# Patient Record
Sex: FEMALE | Race: WHITE | Employment: OTHER | URBAN - METROPOLITAN AREA
[De-identification: names, ages, dates, MRNs, and addresses within clinical notes are randomized per-mention and may not be internally consistent; named-entity substitution may affect disease eponyms.]

---

## 2017-01-11 ENCOUNTER — OP HISTORICAL/CONVERTED ENCOUNTER (OUTPATIENT)
Dept: OTHER | Age: 62
End: 2017-01-11

## 2017-08-11 ENCOUNTER — OP HISTORICAL/CONVERTED ENCOUNTER (OUTPATIENT)
Dept: OTHER | Age: 62
End: 2017-08-11

## 2017-09-07 ENCOUNTER — OP HISTORICAL/CONVERTED ENCOUNTER (OUTPATIENT)
Dept: OTHER | Age: 62
End: 2017-09-07

## 2017-09-20 ENCOUNTER — OP HISTORICAL/CONVERTED ENCOUNTER (OUTPATIENT)
Dept: OTHER | Age: 62
End: 2017-09-20

## 2019-02-15 RX ORDER — DIAZEPAM 2 MG/1
2 TABLET ORAL 2 TIMES DAILY
COMMUNITY
End: 2022-05-02

## 2019-02-15 RX ORDER — CHOLECALCIFEROL (VITAMIN D3) 125 MCG
5000 CAPSULE ORAL
COMMUNITY

## 2019-02-15 RX ORDER — PROPRANOLOL HYDROCHLORIDE 60 MG/1
10 CAPSULE, EXTENDED RELEASE ORAL AS NEEDED
COMMUNITY
End: 2022-05-02

## 2019-02-15 RX ORDER — BUDESONIDE AND FORMOTEROL FUMARATE DIHYDRATE 160; 4.5 UG/1; UG/1
AEROSOL RESPIRATORY (INHALATION) 2 TIMES DAILY
COMMUNITY
End: 2022-01-12

## 2019-02-15 RX ORDER — HYDROCHLOROTHIAZIDE 12.5 MG/1
12.5 TABLET ORAL DAILY
COMMUNITY
End: 2022-01-03 | Stop reason: SDUPTHER

## 2019-02-15 RX ORDER — CITALOPRAM 40 MG/1
40 TABLET, FILM COATED ORAL DAILY
COMMUNITY

## 2019-02-15 RX ORDER — TRAZODONE HYDROCHLORIDE 100 MG/1
100 TABLET ORAL
COMMUNITY
End: 2022-05-02

## 2019-02-15 RX ORDER — BISMUTH SUBSALICYLATE 262 MG
1 TABLET,CHEWABLE ORAL DAILY
COMMUNITY
End: 2022-01-12

## 2019-02-15 RX ORDER — LEVALBUTEROL INHALATION SOLUTION 1.25 MG/3ML
1.25 SOLUTION RESPIRATORY (INHALATION)
COMMUNITY
End: 2022-05-02

## 2019-02-15 RX ORDER — BUSPIRONE HYDROCHLORIDE 10 MG/1
10 TABLET ORAL DAILY
COMMUNITY

## 2019-02-15 RX ORDER — MONTELUKAST SODIUM 10 MG/1
10 TABLET ORAL
COMMUNITY
End: 2022-01-03 | Stop reason: SDUPTHER

## 2019-02-15 RX ORDER — PHENOL/SODIUM PHENOLATE
40 AEROSOL, SPRAY (ML) MUCOUS MEMBRANE DAILY
COMMUNITY
End: 2022-08-08 | Stop reason: SDUPTHER

## 2019-02-15 RX ORDER — ESTRADIOL 1 MG/1
1 TABLET ORAL DAILY
COMMUNITY
End: 2022-09-09 | Stop reason: SDUPTHER

## 2019-02-20 ENCOUNTER — HOSPITAL ENCOUNTER (OUTPATIENT)
Age: 64
Setting detail: OUTPATIENT SURGERY
Discharge: HOME | End: 2019-02-20
Attending: COLON & RECTAL SURGERY | Admitting: COLON & RECTAL SURGERY
Payer: MEDICAID

## 2019-02-20 ENCOUNTER — ANESTHESIA EVENT (OUTPATIENT)
Dept: ENDOSCOPY | Age: 64
End: 2019-02-20
Payer: MEDICAID

## 2019-02-20 ENCOUNTER — ANESTHESIA (OUTPATIENT)
Dept: ENDOSCOPY | Age: 64
End: 2019-02-20
Payer: MEDICAID

## 2019-02-20 VITALS
DIASTOLIC BLOOD PRESSURE: 68 MMHG | HEIGHT: 64 IN | BODY MASS INDEX: 36.88 KG/M2 | RESPIRATION RATE: 14 BRPM | WEIGHT: 216 LBS | SYSTOLIC BLOOD PRESSURE: 115 MMHG | HEART RATE: 66 BPM | OXYGEN SATURATION: 100 % | TEMPERATURE: 97.8 F

## 2019-02-20 PROCEDURE — 76060000031 HC ANESTHESIA FIRST 0.5 HR: Performed by: COLON & RECTAL SURGERY

## 2019-02-20 PROCEDURE — 76040000019: Performed by: COLON & RECTAL SURGERY

## 2019-02-20 PROCEDURE — 74011250636 HC RX REV CODE- 250/636

## 2019-02-20 RX ORDER — SODIUM CHLORIDE 0.9 % (FLUSH) 0.9 %
5-40 SYRINGE (ML) INJECTION EVERY 8 HOURS
Status: DISCONTINUED | OUTPATIENT
Start: 2019-02-20 | End: 2019-02-20 | Stop reason: HOSPADM

## 2019-02-20 RX ORDER — PROPOFOL 10 MG/ML
INJECTION, EMULSION INTRAVENOUS AS NEEDED
Status: DISCONTINUED | OUTPATIENT
Start: 2019-02-20 | End: 2019-02-20 | Stop reason: HOSPADM

## 2019-02-20 RX ORDER — PROPOFOL 10 MG/ML
INJECTION, EMULSION INTRAVENOUS
Status: DISCONTINUED | OUTPATIENT
Start: 2019-02-20 | End: 2019-02-20 | Stop reason: HOSPADM

## 2019-02-20 RX ORDER — NALOXONE HYDROCHLORIDE 0.4 MG/ML
0.4 INJECTION, SOLUTION INTRAMUSCULAR; INTRAVENOUS; SUBCUTANEOUS
Status: DISCONTINUED | OUTPATIENT
Start: 2019-02-20 | End: 2019-02-20 | Stop reason: HOSPADM

## 2019-02-20 RX ORDER — GLYCOPYRROLATE 0.2 MG/ML
INJECTION INTRAMUSCULAR; INTRAVENOUS AS NEEDED
Status: DISCONTINUED | OUTPATIENT
Start: 2019-02-20 | End: 2019-02-20

## 2019-02-20 RX ORDER — GLYCOPYRROLATE 0.2 MG/ML
INJECTION INTRAMUSCULAR; INTRAVENOUS AS NEEDED
Status: DISCONTINUED | OUTPATIENT
Start: 2019-02-20 | End: 2019-02-20 | Stop reason: HOSPADM

## 2019-02-20 RX ORDER — SODIUM CHLORIDE 9 MG/ML
INJECTION, SOLUTION INTRAVENOUS
Status: DISCONTINUED | OUTPATIENT
Start: 2019-02-20 | End: 2019-02-20 | Stop reason: HOSPADM

## 2019-02-20 RX ORDER — DEXTROMETHORPHAN/PSEUDOEPHED 2.5-7.5/.8
1.2 DROPS ORAL
Status: DISCONTINUED | OUTPATIENT
Start: 2019-02-20 | End: 2019-02-20 | Stop reason: HOSPADM

## 2019-02-20 RX ORDER — FLUMAZENIL 0.1 MG/ML
0.2 INJECTION INTRAVENOUS
Status: DISCONTINUED | OUTPATIENT
Start: 2019-02-20 | End: 2019-02-20 | Stop reason: HOSPADM

## 2019-02-20 RX ORDER — ATROPINE SULFATE 0.1 MG/ML
0.5 INJECTION INTRAVENOUS
Status: DISCONTINUED | OUTPATIENT
Start: 2019-02-20 | End: 2019-02-20 | Stop reason: HOSPADM

## 2019-02-20 RX ORDER — SODIUM CHLORIDE 0.9 % (FLUSH) 0.9 %
5-40 SYRINGE (ML) INJECTION AS NEEDED
Status: DISCONTINUED | OUTPATIENT
Start: 2019-02-20 | End: 2019-02-20 | Stop reason: HOSPADM

## 2019-02-20 RX ORDER — LIDOCAINE HYDROCHLORIDE 20 MG/ML
INJECTION, SOLUTION EPIDURAL; INFILTRATION; INTRACAUDAL; PERINEURAL AS NEEDED
Status: DISCONTINUED | OUTPATIENT
Start: 2019-02-20 | End: 2019-02-20 | Stop reason: HOSPADM

## 2019-02-20 RX ADMIN — PROPOFOL 20 MG: 10 INJECTION, EMULSION INTRAVENOUS at 13:29

## 2019-02-20 RX ADMIN — PROPOFOL 80 MG: 10 INJECTION, EMULSION INTRAVENOUS at 13:18

## 2019-02-20 RX ADMIN — SODIUM CHLORIDE: 9 INJECTION, SOLUTION INTRAVENOUS at 13:09

## 2019-02-20 RX ADMIN — LIDOCAINE HYDROCHLORIDE 40 MG: 20 INJECTION, SOLUTION EPIDURAL; INFILTRATION; INTRACAUDAL; PERINEURAL at 13:18

## 2019-02-20 RX ADMIN — PROPOFOL 120 MCG/KG/MIN: 10 INJECTION, EMULSION INTRAVENOUS at 13:16

## 2019-02-20 NOTE — PERIOP NOTES
Report from Oswald Marcelino, see anesthesia record. ABD remains soft and non-tender post procedure. Pt has no complaints at this time and tolerated the procedure well. Endoscope was pre-cleaned at bedside immediately following procedure by [facility].

## 2019-02-20 NOTE — PROCEDURES
[facility]   Endo Brief Procedure Note    Kimberlee Fears  1955  [id number]    Date of Procedure: 2/20/2019    Preoperative diagnosis: T83.711A Erosion of implanted vaginal mesh to surrounding organ or tissue, initial encounter    Postoperative diagnosis: * No post-op diagnosis entered *    Procedure: Procedure(s):  COLONOSCOPY    :  Dr. Sameer Medina MD    Assistant(s): Endoscopy RN-1: Kristina Caraballo RN  Endoscopy RN-2: Isa Pang RN    EBL:None    Anesthesia/Sedation: Moderate sedation / MAC    Specimens: * No specimens in log *    Findings: diverticulosis    Complications: None    Dr. Sameer Medina MD  2/20/2019  1:35 PM

## 2019-02-20 NOTE — ANESTHESIA POSTPROCEDURE EVALUATION
Procedure(s):  COLONOSCOPY. Anesthesia Post Evaluation      Multimodal analgesia: multimodal analgesia used between 6 hours prior to anesthesia start to PACU discharge  Patient location during evaluation: PACU  Patient participation: complete - patient participated  Level of consciousness: awake and alert  Airway patency: patent  Anesthetic complications: no  Cardiovascular status: acceptable  Respiratory status: acceptable  Hydration status: acceptable        Visit Vitals  BP 96/52   Pulse 70   Temp 36.6 °C (97.8 °F)   Resp 16   Ht 5' 3.75\" (1.619 m)   Wt 98 kg (216 lb)   SpO2 100%   Breastfeeding?  No   BMI 37.37 kg/m²

## 2019-02-20 NOTE — ANESTHESIA PREPROCEDURE EVALUATION
Anesthetic History   No history of anesthetic complications            Review of Systems / Medical History  Patient summary reviewed, nursing notes reviewed and pertinent labs reviewed    Pulmonary        Sleep apnea    Asthma (well controlled, no attack in 8 months)        Neuro/Psych         Psychiatric history     Cardiovascular  Within defined limits                  Comments:  Inderal PRN for anxiety           GI/Hepatic/Renal  Within defined limits   GERD           Endo/Other    Diabetes    Obesity and arthritis     Other Findings   Comments: Erosion of implanted vaginal mesh to surrounding organ or tissue, initial encounter    Chronic vaginal discharge    Patient is a nurse           Physical Exam    Airway  Mallampati: II  TM Distance: < 4 cm  Neck ROM: normal range of motion   Mouth opening: Normal     Cardiovascular  Regular rate and rhythm,  S1 and S2 normal,  no murmur, click, rub, or gallop  Rhythm: regular  Rate: normal         Dental  No notable dental hx       Pulmonary  Breath sounds clear to auscultation               Abdominal  GI exam deferred       Other Findings            Anesthetic Plan    ASA: 3  Anesthesia type: MAC            Anesthetic plan and risks discussed with: Patient

## 2019-02-20 NOTE — ROUTINE PROCESS
Migue Lozano  1955  [id number]    Situation:  Verbal report received from: Pallavi  Procedure: Procedure(s):  COLONOSCOPY    Background:    Preoperative diagnosis: T83.711A Erosion of implanted vaginal mesh to surrounding organ or tissue, initial encounter  Postoperative diagnosis: Diverticulosis    :  Dr. Dill Left  Assistant(s): Endoscopy RN-1: Elyssa Saunders RN  Endoscopy RN-2: Brianna Parrish RN    Specimens: * No specimens in log *  H. Pylori  no    Assessment:  Intra-procedure medications   Anesthesia gave intra-procedure sedation and medications, see anesthesia flow sheet yes    Intravenous fluids: NS@ KVO     Vital signs stable     Abdominal assessment: round and soft     Recommendation:  Discharge patient per MD order.   Return to floor  Family or Friend   Permission to share finding with family or friend yes

## 2019-02-20 NOTE — DISCHARGE INSTRUCTIONS
Meagan Villegas  [id number]  1955    COLON DISCHARGE INSTRUCTIONS  Discomfort:  Redness at IV site- apply warm compress to area; if redness or soreness persist- contact your physician  There may be a slight amount of blood passed from the rectum  Gaseous discomfort- walking, belching will help relieve any discomfort  You may not operate a vehicle for 12 hours  You may not engage in an occupation involving machinery or appliances for rest of today  You may not drink alcoholic beverages for at least 12 hours  Avoid making any critical decisions for at least 24 hour  DIET:   High fiber diet. - however -  remember your colon is empty and a heavy meal will produce gas. Avoid these foods:  vegetables, fried / greasy foods, carbonated drinks for today    MEDICATIONS:           ACTIVITY:  You may resume your normal daily activities it is recommended that you spend the remainder of the day resting -  avoid any strenuous activity. CALL M.D. ANY SIGN OF:   Increasing pain, nausea, vomiting  Abdominal distension (swelling)  New increased bleeding (oral or rectal)  Fever (chills)  Pain in chest area  Bloody discharge from nose or mouth  Shortness of breath     Follow-up Instructions:   Call Michael Juarez MD if any questions or problems. Telephone # [phone]  Should have a repeat colonoscopy in 10 years. COLONOSCOPY FINDINGS:  Your colonoscopy showed: diverticulosis; no erosion of mesh into rectum or colon.

## 2019-02-20 NOTE — H&P
Gastroenterology Outpatient History and Physical    Patient: Pernell Serrano    Physician: Rupinder Bennett MD    Vital Signs: Blood pressure 142/71, pulse 72, temperature 97.9 °F (36.6 °C), resp. rate 13, height 5' 3.75\" (1.619 m), weight 98 kg (216 lb), SpO2 100 %, not currently breastfeeding. Allergies: Allergies   Allergen Reactions    Celebrex [Celecoxib] Contact Dermatitis    Iodinated Contrast- Oral And Iv Dye Shortness of Breath     Short of breath, swelling of mouth, tongue, and throat.  Neosporin [Neomycin-Bacitracnzn-Polymyxnb] Contact Dermatitis       Chief Complaint: change in bowel habits    History of Present Illness: as above    Justification for Procedure: as above    History:  Past Medical History:   Diagnosis Date    Arthritis     osteo    Asthma     Diabetes ([address]     Borderline diabetes    GERD (gastroesophageal reflux disease)     Ill-defined condition     Left ventricular heart hypertension without heart failure    Ill-defined condition     spondylosis of lumbar    Ill-defined condition     abnormality of gait, uses walking cane    Ill-defined condition     insomnia    Ill-defined condition     vitamin D deficiency    Ill-defined condition     hearing loss wears, hearing aids bilat    Psychiatric disorder     anxiety, depression    Sleep apnea     Uses C-Pap machine at night      Past Surgical History:   Procedure Laterality Date    HX GI      last colonoscopy 2015    HX GYN      bladder sling applied and later removed    HX GYN      MIGUEL    NEUROLOGICAL PROCEDURE UNLISTED      removal of pituitary tumor 1982      Social History     Socioeconomic History    Marital status:      Spouse name: Not on file    Number of children: Not on file    Years of education: Not on file    Highest education level: Not on file   Tobacco Use    Smoking status: Never Smoker    Smokeless tobacco: Never Used   Substance and Sexual Activity    Alcohol use:  No Frequency: Never    Drug use: No    History reviewed. No pertinent family history. Medications:   Prior to Admission medications    Medication Sig Start Date End Date Taking? Authorizing Provider   Omeprazole delayed release (PRILOSEC D/R) 20 mg tablet Take 20 mg by mouth daily. Yes Provider, Historical   hydroCHLOROthiazide (HYDRODIURIL) 12.5 mg tablet Take 12.5 mg by mouth daily. Yes Provider, Historical   estradiol (ESTRACE) 1 mg tablet Take 1 mg by mouth daily. Yes Provider, Historical   citalopram (CELEXA) 40 mg tablet Take 40 mg by mouth daily. Yes Provider, Historical   busPIRone (BUSPAR) 10 mg tablet Take 10 mg by mouth three (3) times daily. Yes Provider, Historical   diazePAM (VALIUM) 2 mg tablet Take 2 mg by mouth two (2) times a day. Yes Provider, Historical   montelukast (SINGULAIR) 10 mg tablet Take 10 mg by mouth. Yes Provider, Historical   traZODone (DESYREL) 100 mg tablet Take 100 mg by mouth nightly. Yes Provider, Historical   budesonide-formoterol (SYMBICORT) 160-4.5 mcg/actuation HFAA Take  by inhalation two (2) times a day. Yes Provider, Historical   propranolol LA (INDERAL LA) 60 mg SR capsule Take 10 mg by mouth three (3) times daily as needed. Yes Provider, Historical   cholecalciferol, vitamin D3, (VITAMIN D3) 2,000 unit tab Take  by mouth. Provider, Historical   multivitamin (ONE A DAY) tablet Take 1 Tab by mouth daily. Provider, Historical   levalbuterol (XOPENEX) 1.25 mg/3 mL nebu 1.25 mg by Nebulization route every six (6) hours as needed. Provider, Historical       Physical Exam:   General: alert, no distress   HEENT: Head: Normocephalic, no lesions, without obvious abnormality.    Heart: regular rate and rhythm, S1, S2 normal, no murmur, click, rub or gallop   Lungs: chest clear, no wheezing, rales, normal symmetric air entry   Abdominal: Bowel sounds are normal, liver is not enlarged, spleen is not enlarged   Neurological: Grossly normal   Extremities: extremities normal, atraumatic, no cyanosis or edema     Findings/Diagnosis: as above    Plan of Care/Planned Procedure: colonoscopy    Signed By: Michael Juarez MD     February 20, 2019

## 2019-02-21 NOTE — OP NOTES
Nilson Aguiar [facility] 81  OPERATIVE REPORT    Name:  Hussain Rawls  MR#:  [id number]  :  1955  ACCOUNT #:  [de-identified]  DATE OF SERVICE:  2019    PREOPERATIVE DIAGNOSIS:  Rule out mesh erosion into rectum or colon. POSTOPERATIVE DIAGNOSIS:  Minimal diverticulosis. No erosion of mesh appreciated on  the colonic or rectal side. PROCEDURE PERFORMED:  Colonoscopy. SURGEON:  Rsoa Ramírez MD    ASSISTANT:  Dominga    ANESTHESIA:  MAC    COMPLICATIONS:  None. SPECIMENS REMOVED:  None. IMPLANTS:  None. ESTIMATED BLOOD LOSS:  None. INDICATIONS:  The patient is 66years old. She has had some vaginal discharge and  evidence of erosion of mesh into the vagina. PROCEDURE:  After mechanical bowel prep and after intravenous sedation with 320 mg of  propofol, the Olympus scope was introduced into the rectum and advanced under direct  visualization to the cecum. Ileocecal valve and cecal cap well visualized. Good  bowel prep. Scope was slowly withdrawn. Mucosa inspected. No polyps or neoplasia  were seen. She did have diverticulosis of the sigmoid which is uncomplicated. Transit through the sigmoid was quite easy. Very careful examination of the anus,  rectum, and sigmoid showed no evidence of any mesh erosion here. The mesh was  palpable on the vaginal side with some erosion on the vaginal aspect but no evident  erosion into the bowel at any leve. RECOMMENDATION:  Dr. Elijah Grajeda will proceed with surgery as indicated. Repeat  colonoscopy in 10 years.         MD LEV Mccoy/[email or web address]/  D:  2019 13:52  T:  2019 22:05  JOB #:  [id number]  CC:  MD Oralia Evans MD       [facility]

## 2019-10-06 ENCOUNTER — IP HISTORICAL/CONVERTED ENCOUNTER (OUTPATIENT)
Dept: OTHER | Age: 64
End: 2019-10-06

## 2020-09-12 ENCOUNTER — HOSPITAL ENCOUNTER (OUTPATIENT)
Dept: PREADMISSION TESTING | Age: 65
Discharge: HOME | End: 2020-09-12
Payer: MEDICARE

## 2020-09-12 PROCEDURE — 87635 SARS-COV-2 COVID-19 AMP PRB: CPT

## 2020-09-15 LAB — SARS-COV-2, COV2NT: NOT DETECTED

## 2020-09-16 ENCOUNTER — HOSPITAL ENCOUNTER (OUTPATIENT)
Dept: GENERAL RADIOLOGY | Age: 65
Discharge: HOME | End: 2020-09-16
Payer: MEDICARE

## 2020-09-16 ENCOUNTER — HOSPITAL ENCOUNTER (OUTPATIENT)
Dept: PULMONOLOGY | Age: 65
Discharge: HOME | End: 2020-09-16
Payer: MEDICARE

## 2020-09-16 DIAGNOSIS — J45.909 ASTHMA: ICD-10-CM

## 2020-09-16 PROCEDURE — 94726 PLETHYSMOGRAPHY LUNG VOLUMES: CPT

## 2020-09-16 PROCEDURE — 94375 RESPIRATORY FLOW VOLUME LOOP: CPT

## 2020-09-16 PROCEDURE — 71046 X-RAY EXAM CHEST 2 VIEWS: CPT

## 2020-09-16 PROCEDURE — 94729 DIFFUSING CAPACITY: CPT

## 2021-02-11 PROBLEM — F41.9 ANXIETY: Status: ACTIVE | Noted: 2021-02-11

## 2021-02-11 PROBLEM — K21.9 GASTROESOPHAGEAL REFLUX: Status: ACTIVE | Noted: 2021-02-11

## 2021-02-11 PROBLEM — N76.5 VAGINAL ULCER: Status: ACTIVE | Noted: 2021-02-11

## 2021-02-11 PROBLEM — Z78.0 MENOPAUSE: Status: ACTIVE | Noted: 2021-02-11

## 2021-02-11 PROBLEM — J45.909 ASTHMA: Status: ACTIVE | Noted: 2021-02-11

## 2021-02-11 PROBLEM — B00.9 HERPES SIMPLEX TYPE 1 ANTIBODY POSITIVE: Status: ACTIVE | Noted: 2021-02-11

## 2021-02-11 PROBLEM — I10 HYPERTENSIVE DISORDER: Status: ACTIVE | Noted: 2021-02-11

## 2021-02-11 RX ORDER — TRIAMCINOLONE ACETONIDE 1 MG/G
OINTMENT TOPICAL 2 TIMES DAILY
COMMUNITY

## 2021-02-11 RX ORDER — INDOMETHACIN 50 MG/1
CAPSULE ORAL 3 TIMES DAILY
COMMUNITY
End: 2022-01-03

## 2021-02-11 RX ORDER — ONDANSETRON 4 MG/1
4 TABLET, ORALLY DISINTEGRATING ORAL
COMMUNITY
End: 2022-01-03

## 2021-09-01 ENCOUNTER — HOSPITAL ENCOUNTER (OUTPATIENT)
Dept: GENERAL RADIOLOGY | Age: 66
Discharge: HOME | End: 2021-09-01
Payer: MEDICARE

## 2021-09-01 ENCOUNTER — TRANSCRIBE ORDER (OUTPATIENT)
Dept: GENERAL RADIOLOGY | Age: 66
End: 2021-09-01

## 2021-09-01 DIAGNOSIS — J45.909 ASTHMA: Primary | ICD-10-CM

## 2021-09-01 DIAGNOSIS — J45.909 ASTHMA: ICD-10-CM

## 2021-09-01 PROCEDURE — 71046 X-RAY EXAM CHEST 2 VIEWS: CPT

## 2022-01-03 ENCOUNTER — OFFICE VISIT (OUTPATIENT)
Dept: FAMILY MEDICINE CLINIC | Age: 67
End: 2022-01-03
Payer: MEDICARE

## 2022-01-03 VITALS
BODY MASS INDEX: 38.29 KG/M2 | WEIGHT: 224.3 LBS | HEIGHT: 64 IN | TEMPERATURE: 98.3 F | HEART RATE: 80 BPM | OXYGEN SATURATION: 97 % | DIASTOLIC BLOOD PRESSURE: 81 MMHG | SYSTOLIC BLOOD PRESSURE: 121 MMHG

## 2022-01-03 DIAGNOSIS — Z79.890 HORMONE REPLACEMENT THERAPY (POSTMENOPAUSAL): ICD-10-CM

## 2022-01-03 DIAGNOSIS — J30.81 ALLERGIC RHINITIS DUE TO ANIMAL HAIR AND DANDER: ICD-10-CM

## 2022-01-03 DIAGNOSIS — E78.49 OTHER HYPERLIPIDEMIA: ICD-10-CM

## 2022-01-03 DIAGNOSIS — Z13.820 ENCOUNTER FOR SCREENING FOR OSTEOPOROSIS: ICD-10-CM

## 2022-01-03 DIAGNOSIS — R06.2 WHEEZING: ICD-10-CM

## 2022-01-03 DIAGNOSIS — K21.9 GASTROESOPHAGEAL REFLUX DISEASE, UNSPECIFIED WHETHER ESOPHAGITIS PRESENT: ICD-10-CM

## 2022-01-03 DIAGNOSIS — H91.13 PRESBYCUSIS OF BOTH EARS: ICD-10-CM

## 2022-01-03 DIAGNOSIS — E11.9 TYPE 2 DIABETES MELLITUS WITHOUT COMPLICATION, WITHOUT LONG-TERM CURRENT USE OF INSULIN (HCC): ICD-10-CM

## 2022-01-03 DIAGNOSIS — J45.40 MODERATE PERSISTENT ASTHMA, UNSPECIFIED WHETHER COMPLICATED: Primary | ICD-10-CM

## 2022-01-03 DIAGNOSIS — G47.33 OBSTRUCTIVE SLEEP APNEA SYNDROME: ICD-10-CM

## 2022-01-03 DIAGNOSIS — F41.9 ANXIETY: ICD-10-CM

## 2022-01-03 DIAGNOSIS — M15.9 OSTEOARTHRITIS OF MULTIPLE JOINTS, UNSPECIFIED OSTEOARTHRITIS TYPE: ICD-10-CM

## 2022-01-03 DIAGNOSIS — G47.09 OTHER INSOMNIA: ICD-10-CM

## 2022-01-03 DIAGNOSIS — I10 PRIMARY HYPERTENSION: ICD-10-CM

## 2022-01-03 PROBLEM — E55.9 VITAMIN D DEFICIENCY: Status: ACTIVE | Noted: 2022-01-03

## 2022-01-03 LAB
ALBUMIN SERPL-MCNC: 3.5 G/DL (ref 3.5–5)
ALBUMIN/GLOB SERPL: 1 {RATIO} (ref 1.1–2.2)
ALP SERPL-CCNC: 104 U/L (ref 45–117)
ALT SERPL-CCNC: 21 U/L (ref 12–78)
ANION GAP SERPL CALC-SCNC: 5 MMOL/L (ref 5–15)
AST SERPL-CCNC: 13 U/L (ref 15–37)
BASOPHILS # BLD: 0.1 K/UL (ref 0–0.1)
BASOPHILS NFR BLD: 1 % (ref 0–1)
BILIRUB SERPL-MCNC: 0.2 MG/DL (ref 0.2–1)
BUN SERPL-MCNC: 11 MG/DL (ref 6–20)
BUN/CREAT SERPL: 15 (ref 12–20)
CALCIUM SERPL-MCNC: 9.2 MG/DL (ref 8.5–10.1)
CHLORIDE SERPL-SCNC: 103 MMOL/L (ref 97–108)
CO2 SERPL-SCNC: 29 MMOL/L (ref 21–32)
CREAT SERPL-MCNC: 0.74 MG/DL (ref 0.55–1.02)
DIFFERENTIAL METHOD BLD: ABNORMAL
EOSINOPHIL # BLD: 0.4 K/UL (ref 0–0.4)
EOSINOPHIL NFR BLD: 4 % (ref 0–7)
ERYTHROCYTE [DISTWIDTH] IN BLOOD BY AUTOMATED COUNT: 13.1 % (ref 11.5–14.5)
GLOBULIN SER CALC-MCNC: 3.5 G/DL (ref 2–4)
GLUCOSE SERPL-MCNC: 119 MG/DL (ref 65–100)
HCT VFR BLD AUTO: 38.4 % (ref 35–47)
HGB BLD-MCNC: 12.5 G/DL (ref 11.5–16)
IMM GRANULOCYTES # BLD AUTO: 0 K/UL (ref 0–0.04)
IMM GRANULOCYTES NFR BLD AUTO: 1 % (ref 0–0.5)
LYMPHOCYTES # BLD: 1.9 K/UL (ref 0.8–3.5)
LYMPHOCYTES NFR BLD: 21 % (ref 12–49)
MCH RBC QN AUTO: 32 PG (ref 26–34)
MCHC RBC AUTO-ENTMCNC: 32.6 G/DL (ref 30–36.5)
MCV RBC AUTO: 98.2 FL (ref 80–99)
MONOCYTES # BLD: 0.7 K/UL (ref 0–1)
MONOCYTES NFR BLD: 7 % (ref 5–13)
NEUTS SEG # BLD: 5.8 K/UL (ref 1.8–8)
NEUTS SEG NFR BLD: 66 % (ref 32–75)
NRBC # BLD: 0 K/UL (ref 0–0.01)
NRBC BLD-RTO: 0 PER 100 WBC
PLATELET # BLD AUTO: 367 K/UL (ref 150–400)
PMV BLD AUTO: 11.2 FL (ref 8.9–12.9)
POTASSIUM SERPL-SCNC: 4 MMOL/L (ref 3.5–5.1)
PROT SERPL-MCNC: 7 G/DL (ref 6.4–8.2)
RBC # BLD AUTO: 3.91 M/UL (ref 3.8–5.2)
SODIUM SERPL-SCNC: 137 MMOL/L (ref 136–145)
WBC # BLD AUTO: 8.8 K/UL (ref 3.6–11)

## 2022-01-03 PROCEDURE — 99204 OFFICE O/P NEW MOD 45 MIN: CPT | Performed by: FAMILY MEDICINE

## 2022-01-03 PROCEDURE — 3017F COLORECTAL CA SCREEN DOC REV: CPT | Performed by: FAMILY MEDICINE

## 2022-01-03 PROCEDURE — G8417 CALC BMI ABV UP PARAM F/U: HCPCS | Performed by: FAMILY MEDICINE

## 2022-01-03 PROCEDURE — G8754 DIAS BP LESS 90: HCPCS | Performed by: FAMILY MEDICINE

## 2022-01-03 PROCEDURE — G8427 DOCREV CUR MEDS BY ELIG CLIN: HCPCS | Performed by: FAMILY MEDICINE

## 2022-01-03 PROCEDURE — 3046F HEMOGLOBIN A1C LEVEL >9.0%: CPT | Performed by: FAMILY MEDICINE

## 2022-01-03 PROCEDURE — 2022F DILAT RTA XM EVC RTNOPTHY: CPT | Performed by: FAMILY MEDICINE

## 2022-01-03 PROCEDURE — G8432 DEP SCR NOT DOC, RNG: HCPCS | Performed by: FAMILY MEDICINE

## 2022-01-03 PROCEDURE — G0463 HOSPITAL OUTPT CLINIC VISIT: HCPCS | Performed by: FAMILY MEDICINE

## 2022-01-03 PROCEDURE — G8752 SYS BP LESS 140: HCPCS | Performed by: FAMILY MEDICINE

## 2022-01-03 RX ORDER — HYDROCHLOROTHIAZIDE 12.5 MG/1
12.5 TABLET ORAL DAILY
Qty: 90 TABLET | Refills: 0 | Status: SHIPPED | OUTPATIENT
Start: 2022-01-03 | End: 2022-08-08 | Stop reason: SDUPTHER

## 2022-01-03 RX ORDER — UREA 10 %
100 LOTION (ML) TOPICAL DAILY
COMMUNITY

## 2022-01-03 RX ORDER — CYCLOBENZAPRINE HCL 5 MG
5 TABLET ORAL
COMMUNITY

## 2022-01-03 RX ORDER — METHYLPREDNISOLONE 4 MG/1
TABLET ORAL
Qty: 1 DOSE PACK | Refills: 0 | Status: SHIPPED | OUTPATIENT
Start: 2022-01-03 | End: 2022-01-12

## 2022-01-03 RX ORDER — MONTELUKAST SODIUM 10 MG/1
10 TABLET ORAL DAILY
Qty: 90 TABLET | Refills: 0 | Status: SHIPPED | OUTPATIENT
Start: 2022-01-03 | End: 2022-05-02

## 2022-01-03 RX ORDER — ASCORBIC ACID 250 MG
500 TABLET ORAL
COMMUNITY

## 2022-01-03 NOTE — PROGRESS NOTES
Opioid Risk Tool This tool should be administered to patients upon an initial visit prior to beginning opioid therapy for pain management. If female If male Score selected   1. Family History of Substance Abuse     Alcohol    Illegal Drugs    Prescription Drugs 1  2  4 3  3  4 3          2.  Personal History of Substance Abuse   Alcohol    Illegal Drugs    Prescription Drug 3  4  5 3  4  5 3       3.  Age (1 Point if 16-45 years)    1   1   0   4.  History of Preadolescence Sexual Abuse    3   0   0   5.  Psychological Disease   Attention-Deficit/Hyperactivity Disorder; Obsessive Compulsive Disorder; Bipolar Disorder; Schizophrenia      Depression   2      1   2      1 0        0    Total Score   6    Low Risk 0 - 3   Moderate Risk 4 - 7    High Risk > 8  Total Score Risk Category   6  Reference: Dale HUNT. Predicting aberrant behaviors in opioid-treated patients: Preliminary validation of the opioid risk tool.  Pain Medicine. 2005;6(6):432-442. Used with permission.    D.I.R.E. Score: Patient Selection for Chronic Opioid Analgesia   For each factor, rate the patient s score from 1-3 based on the explanations in the right hand column.   Score Factor Explanation Diagnosis        3  1 = Benign chronic condition with minimal objective findings or no definite medical diagnosis. Examples: fibromyalgia, migraine headaches, nonspecific back pain.   2 = Slowly progressive condition concordant with moderate pain, or fixed condition with moderate objective findings. Examples: failed back surgery syndrome, back pain with moderate degenerative changes, neuropathic pain.   3 = Advanced condition concordant with severe pain with objective findings. Examples: severe ischemic vascular disease, advanced neuropathy, severe spinal stenosis.    Intractability       3  1 = Few therapies have been tried and the patient takes a passive role in his/her pain management process.   2 = Most customary treatments have been tried but the  Ivis Mishra (: 1955) is a 61 y.o. female, established patient, here for evaluation of the following chief complaint(s):  Establish Care (pt is changing PCP's ) and Asthma (pt is wheezing, believes it is d/t her allergies. )         ASSESSMENT/PLAN:  Below is the assessment and plan developed based on review of pertinent history, physical exam, labs, studies, and medications. 1. Moderate persistent asthma, unspecified whether complicated  O2 saturation 97% at rest and 92% when walking in office with no signs of respiratory distress. Sx unchanged x 3 weeks. No indications for antibiotics at this time. Consider asthma exacerbation. Consider bronchoconstriction or laryngeal stricture as cause of expiratory wheeze when talking.   -Rx 10 day medrol dose pack. Choose longer steroid taper due to patient having improvement but not resolution in sx with 6 day taper 1 month ago. -Advised patient to use Xopenex q 6 hours for next 3 days on schedule rather than PRN. -CXR ordered and patient instructed to walk in hospital or [facility] to have this done this afternoon to evaluate for lung pathology as cause for persistent wheeze.   -Patient to call pulmonologist this afternoon for sooner appointment for further evaluation and treatment, recommend she see pulmonologist to discuss laryngoscopy    -Written Rx provided for patient to obtain home pulse ox. Monitor O2, go to ED for O2 <90%, worsening SOB, difficulty breathing, chest pain and other severe sx. Patient voices understanding.     -     methylPREDNISolone (MEDROL DOSEPACK) 4 mg tablet; 10 day dose pack, Normal, Disp-1 Dose Pack, R-0  -     XR CHEST PA LAT; Future  -     montelukast (Singulair) 10 mg tablet; Take 1 Tablet by mouth daily. , Normal, Disp-90 Tablet, R-0  -     METABOLIC PANEL, COMPREHENSIVE; Future  -     CBC WITH AUTOMATED DIFF; Future  -     OTHER; Pulse Ox.  Use to monitor oxygen saturation daily and when shortness of breath occurs. Dx code: W11.249, Print, Disp-1 Units, R-0    2. Wheezing  See above  -     methylPREDNISolone (MEDROL DOSEPACK) 4 mg tablet; 10 day dose pack, Normal, Disp-1 Dose Pack, R-0  -     XR CHEST PA LAT; Future    3. Allergic rhinitis due to animal hair and dander  Continue singulair. Discussed allergen avoidance, patient states she has a dog and she cannot get rid of dog even though dog hair worsens asthma sx.   -     montelukast (Singulair) 10 mg tablet; Take 1 Tablet by mouth daily. , Normal, Disp-90 Tablet, R-0    4. Obstructive sleep apnea syndrome  Full face cpap, managed by Dr. Ebony Schwartz pulmonologist    5. Primary hypertension  At goal in office today. Continue HCTZ  -     hydroCHLOROthiazide (HYDRODIURIL) 12.5 mg tablet; Take 1 Tablet by mouth daily. , Normal, Disp-90 Tablet, R-0  -     METABOLIC PANEL, COMPREHENSIVE; Future    6. Gastroesophageal reflux disease, unspecified whether esophagitis present  Well controlled on omeprazole. Patient takes intermittently    7. Osteoarthritis of multiple joints, unspecified osteoarthritis type  L total knee replacement 2019   Wears braces for thumbs   Follows with Ortho   Uses layton for ambulation PRN     8. Type 2 diabetes mellitus without complication, without long-term current use of insulin ([address]  Per review of previous records, A1C 6.6 1/2021. Plan to address with patient at follow up appointment in 1 week. 9. Other hyperlipidemia  Per patient last fasting lipids borderline elevated 9/2021. Plan to recheck in around 3/2021. 10. Anxiety  Taking valium 2mg BID, celexa 40mg daily, buspar 10mg TID. Takes propranolol PRN  Manged by Psych Dr. Marsha Torres counselor Chelly Barrow     11. Other insomnia  Managed by psych. Trazadone PRN. 12. Presbycusis of both ears  Lost hearing aids. Seeing [facility] for new hearing aids this year     13. Encounter for screening for osteoporosis  Reports DEXA per OBGYN 2017 was normal. Next dye 2/16/2022. patient is not fully engaged in the pain management process, or barriers prevent (insurance, transportation, medical illness).   3 = Patient fully engaged in a spectrum of appropriate treatments but with inadequate response.    Risk  (R = Total of P + S + R + C below)    Psychological:       3  1 = Serious personality dysfunction or mental illness interfering with care.   Example: personality disorder, severe affective disorder, significant personality issues.   2 = Personality or mental health interferes moderately. Example: depression or anxiety disorder.   3 = Good communication with clinic. No significant personality dysfunction or mental illness.    Social Support:     2  1 = Life in chaos. Little family support and few close relationships. Loss of most normal life roles.   2 = Reduction in some relationships and life roles.   3 = Supportive family/close relationships. Involved in work or school and no social isolation.    Reliability:    3   1 = History of numerous problems: medication misuse, missed appointments, rarely follows through.   2 = Occasional difficulties with compliance, but generally reliable.   3 = Highly reliable patient with meds, appointments & treatment.    Chemical Health:  2  1 = Active or very recent use of illicit drugs, excessive alcohol, or prescription drug abuse.   2 = Chemical coper (uses medications to cope with stress) or history of CD in remission.   3 = No CD history. Not drug-focused or chemically reliant.   Efficacy score     2  1 = Poor function or minimal pain relief despite moderate to high doses.   2 = Moderate benefit with function improved in a number of ways (or insufficient info - hasn t tried opioid yet or very low doses or too short of a trial).   3 = Good improvement in pain and function and quality of life with stable doses over time.        16   Total score = D + I + R + E   Score 7-13: Not a suitable candidate for long-term opioid analgesia   Score 14-21: May be  14. Hormone replacement therapy post menopausal   Taking estradiol 1.5mg daily. Has seen Dr. Danna Ragland for years. Plans to switch to [address] [place] [address]. Patient's plan of care discussed with and approved of by Dr. Chiquis Manriquez. Return for 1 week for wheezing. SUBJECTIVE/OBJECTIVE:  Chief Complaint   Patient presents with   [facility] Establish Care     pt is changing PCP's     Asthma     pt is wheezing, believes it is d/t her allergies. Patient  is a 62 yo female presenting to office to establish care. She had previously been seen by Dr. Sukumar Doll for last 25 years but would like PCP closer to home. Prefers to be called [facility]. Retired RN. For HTN, patient takes hctz 12.5mg. She was started on this by Dr. Aquilino Cortez cardiologist after workup for chest pain 10 years ago. During echo discovered she has LVH. She follows with Dr. Aquilino Cortez yearly. History of hyperlipidemia, last labs borderline elevated per patient 3 months ago. Not taking a statin, control with diet. Denies history of CAD, MI or stroke. GERD controlled with Omeprazole which patient takes intermittently. Last colonoscopy with Dr. Cornelia Ruiz 2/19/2020 with rec in 10 years. Anxiety and depression: Follows with Dr. Shashi Slater and Joseph Gonzáles as counselor. Uses propranolol PRN for anxiety from previous PCP. Hearing loss: Sees Audiologist at [facility]. 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 Plans to get new hearing aids this year. Asthma: follows with Dr. Mallory Avalos. Symbicort BID, zopinex PRN as she cannot tolerate albuterol due to heart racing. Cannot tolerate large doses of prednisone due to worsening anxiety. Keeps medrol dose pack at home for asthma exacerbations. Has appointment with Pulmonologist in 1 month. For acute asthma, patient notes [facility] for acute asthma exacerbation and URI.  Negative COVID test at this time, rx given for augmentin and 6 day medrol a candidate for long-term opioid analgesia   Source: [facility]   2005.    DIRE Total Score(s)  No flowsheet data found.    Patient Active Problem List    Diagnosis Date Noted     Alcohol abuse, in remission 02/15/2013     Priority: Medium     Dry since 2008       Allergic rhinitis 02/15/2013     Priority: Medium     Problem list name updated by automated process. 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  Chief Complaint   Patient presents with     Refill Request     refill meds     Blood pressure 135/78, pulse 90, temperature 97.8  F (36.6  C), temperature source Oral, resp. rate 16, weight 86.7 kg (191 lb 3.2 oz), SpO2 94 %.  SUBJECTIVE:  Mikhail Bernard is here primarily for followup of left hip pain.  He was diagnosed with advanced OA/osteonecrosis of the left femoral head last time.  He has had six months of pain.  Tramadol has helped his sleep.  He is used to tramadol because he has already tried NSAIDs and acetaminophen without much success and sleep is a major problem for him.  With the tramadol he is able to sleep quite wel.  During the day, it causes a little bit of dizziness and he sees something in his peripheral vision at times, but overall these symptoms are manageable.  He tries to get by with taking Aleve, two tablets in the morning and two tablets at noon, but occasionally he would need a tramadol during the day when the pain is severe.  He would like to be able to get up and walk a little bit as well.  It is worth it to get in to  dose pack. Patient reports sx included wheezing, \"difficulty taking deep breath. \" Reports sx returned as soon as she stopped steroid  -For last 3 weeks, patient notes wheezing sound when breathing but feels like coming from her throat/upper anterior chest. Sx have not worsened or improved in last 3 weeks. Reports chronic daily cough, occasional clear sputum. Reports aunt had throat cancer. Denies chest tightness, chest pain, nasal congestion, fever, chills, post nasal drip, headache, n/v, changes in taste or smell. Has been using zopenex 2 times a day for last several days, improves sx for short period of time but does not resolve wheezing. Also on singulair for allergies. Dog in home who she is allergic to, asthma sx worse when at home. Improve when outside. Osteoarthritis: total L knee replacement on 5/1/2019. Osteoarthritis in L ankle and L thumb. Has braces for thumbs, has cane for walking when needed. Vit D deficiency: on 2000IU daily. On HRT, sees Dr. Radha Franco at [facility]. Planning to switch to [address] [place] [address]. Review of Systems   Constitutional: Positive for fatigue. Negative for fever and unexpected weight change. HENT: Positive for hearing loss. Negative for congestion, ear discharge, ear pain, postnasal drip, rhinorrhea, sinus pressure, sinus pain, sore throat, trouble swallowing and voice change. Eyes: Negative for pain, itching and visual disturbance. Respiratory: Positive for cough and wheezing. Negative for apnea, choking, chest tightness, shortness of breath and stridor. Cardiovascular: Negative for chest pain, palpitations and leg swelling. Gastrointestinal: Negative for abdominal distention and abdominal pain. Musculoskeletal: Positive for arthralgias. Skin: Negative for rash. Neurological: Negative for syncope and speech difficulty. Psychiatric/Behavioral: The patient is nervous/anxious.         Current Outpatient Medications on File Prior to see an orthopedist.  He has a cane.  I reviewed his ORT and Dire as listed above.    For his COPD, he also needs refills of his prednisone.     He does have frequent gastritis symptoms and he takes a lot of Tums to help manage this.     He also needs a refill of Tums.  He doesn't describe any hematemesis or melena.      OBJECTIVE:   GENERAL:  Patient is alert, pleasant, and in no acute distress.  His eye contact is normal.  He doesn't appear markedly depressed.  He denies that when I ask him about it.  Exam is otherwise not done.     I reviewed his x-ray report that shows advanced arthritis of the left hip.      ASSESSMENT/PLAN:   1.  Hip pain, chronic.  This is probably due to osteonecrosis.  His risk factor for that is chronic steroid use.  He has steroid-dependent COPD.  He also has a component of gastritis/reflux that is likely aggravated by NSAIDs.     His DIRE would suggest thathe is a good candidate for long-term opiates.  He has had a nice response already.  His ORT would suggest that he is at moderate risk for aberrant behavior.     My plan today is to refill his tramadol.  He will take one tablet at night and a half-tablet p.r.n during the day; a quantity of 60 was given.  I reviewed the prescription database before prescribing and the only prescription for controlled substances that he has is the one that I gave him last time.     I put a referral into Orthopedics for a consult regarding getting a total hip arthroplasty.  We'll continue the Aleve during the day.  I encouraged him to get up and walk some, as this is good for his overall mental and pulmonary health.  I'm going to add omeprazole 20 mg daily for GI prophylaxis due to the fact that he is on steroids and NSAIDs and his risk of a gastric ulcer is high.  We'll reevaluate the effectiveness of this therapy the next time he returns.  We also had a discussion about cancer screening.  He prefers a FIT test, but he wants to get through the hip issues  Visit   Medication Sig Dispense Refill    cyanocobalamin (VITAMIN B12) 100 mcg tablet Take 100 mcg by mouth daily.  ascorbic acid, vitamin C, (Vitamin C) 250 mg tablet Take  by mouth.  zinc sulfate (ZINC-15 PO) Take  by mouth.  cyclobenzaprine (FLEXERIL) 5 mg tablet Take 5 mg by mouth three (3) times daily as needed for Muscle Spasm(s).  triamcinolone acetonide (KENALOG) 0.1 % ointment Apply  to affected area two (2) times a day. use thin layer      Omeprazole delayed release (PRILOSEC D/R) 20 mg tablet Take 20 mg by mouth daily.  estradiol (ESTRACE) 1 mg tablet Take 1.5 mg by mouth daily.  citalopram (CELEXA) 40 mg tablet Take 40 mg by mouth daily.  busPIRone (BUSPAR) 10 mg tablet Take 10 mg by mouth three (3) times daily.  cholecalciferol, vitamin D3, (VITAMIN D3) 2,000 unit tab Take  by mouth.  diazePAM (VALIUM) 2 mg tablet Take 2 mg by mouth two (2) times a day.  traZODone (DESYREL) 100 mg tablet Take 100 mg by mouth nightly.  budesonide-formoterol (SYMBICORT) 160-4.5 mcg/actuation HFAA Take  by inhalation two (2) times a day.  propranolol LA (INDERAL LA) 60 mg SR capsule Take 10 mg by mouth three (3) times daily as needed.  levalbuterol (XOPENEX) 1.25 mg/3 mL nebu 1.25 mg by Nebulization route every six (6) hours as needed.  [DISCONTINUED] indomethacin (INDOCIN) 50 mg capsule Take  by mouth three (3) times daily. (Patient not taking: Reported on 1/3/2022)      [DISCONTINUED] ondansetron (ZOFRAN ODT) 4 mg disintegrating tablet Take 4 mg by mouth every eight (8) hours as needed for Nausea or Vomiting. (Patient not taking: Reported on 1/3/2022)      multivitamin (ONE A DAY) tablet Take 1 Tab by mouth daily.  [DISCONTINUED] hydroCHLOROthiazide (HYDRODIURIL) 12.5 mg tablet Take 12.5 mg by mouth daily.  [DISCONTINUED] montelukast (SINGULAIR) 10 mg tablet Take 10 mg by mouth.        No current facility-administered medications on file first.          prior to visit. Vitals:    01/03/22 0918   BP: 121/81   Pulse: 80   Temp: 98.3 °F (36.8 °C)   SpO2: 97%   Weight: 224 lb 4.8 oz (101.7 kg)   Height: 5' 3.75\" (1.619 m)   PainSc:   0 - No pain        Physical Exam  Constitutional:       Appearance: Normal appearance. HENT:      Head: Normocephalic and atraumatic. Right Ear: Tympanic membrane, ear canal and external ear normal.      Left Ear: Tympanic membrane, ear canal and external ear normal.      Nose: Nose normal. No congestion or rhinorrhea. Mouth/Throat:      Mouth: Mucous membranes are moist.      Pharynx: Oropharynx is clear. No oropharyngeal exudate or posterior oropharyngeal erythema. Eyes:      General:         Right eye: No discharge. Left eye: No discharge. Extraocular Movements: Extraocular movements intact. Conjunctiva/sclera: Conjunctivae normal.      Pupils: Pupils are equal, round, and reactive to light. Cardiovascular:      Rate and Rhythm: Normal rate and regular rhythm. Pulses: Normal pulses. Heart sounds: Normal heart sounds. Pulmonary:      Effort: Pulmonary effort is normal. No tachypnea, bradypnea, accessory muscle usage, respiratory distress or retractions. Breath sounds: No decreased air movement. Examination of the right-upper field reveals wheezing. Examination of the left-upper field reveals wheezing. Wheezing (expiratory wheeze auscultated only when patient coughs) present. No decreased breath sounds. Comments: Soft expiratory wheeze audible while patient is speaking. Chest:      Chest wall: No tenderness. Abdominal:      General: Abdomen is flat. Bowel sounds are normal.      Palpations: Abdomen is soft. Neurological:      Mental Status: She is alert. Psychiatric:         Mood and Affect: Mood normal.         Behavior: Behavior normal.             An electronic signature was used to authenticate this note.   -- Alberto Muñoz PA-C

## 2022-01-03 NOTE — PROGRESS NOTES
Ynes Gold is a 61 y.o. female , id x 2(name and ). Reviewed record, history, and  medications. Chief Complaint   Patient presents with   [facility]     pt is changing PCP's     Asthma     pt is wheezing, believes it is d/t her allergies. Vitals:    22 0918   BP: 121/81   Pulse: 80   Temp: 98.3 °F (36.8 °C)   SpO2: 97%   Weight: 224 lb 4.8 oz (101.7 kg)   Height: 5' 3.75\" (1.619 m)       Coordination of Care Questionnaire:   1) Have you been to an emergency room, urgent care, or hospitalized since your last visit?   no       2. Have seen or consulted any other health care provider since your last visit? NO      3 most recent PHQ Screens 1/3/2022   PHQ Not Done (No Data)   Little interest or pleasure in doing things Several days   Feeling down, depressed, irritable, or hopeless Several days   Total Score PHQ 2 2       Patient is accompanied by self I have received verbal consent from Ynes Gold to discuss any/all medical information while they are present in the room.

## 2022-01-03 NOTE — PATIENT INSTRUCTIONS
Medrol dose pack for 10 days called in to [facility]  Use Xopenex every 6 hours for next 3 to 4 days  Chest x ray-- walk in to hospital ([facility]) or [address] [place] [address]     Call pulmonologist for sooner appointment     Go to ED if having worsening shortness of breath, chest pain, confusion or other severe symptoms      Shortness of Breath: Care Instructions  Your Care Instructions     Shortness of breath has many causes. Sometimes conditions such as anxiety can lead to shortness of breath. Some people get mild shortness of breath when they exercise. Trouble breathing also can be a symptom of a serious problem, such as asthma, lung disease, emphysema, heart problems, and pneumonia. If your shortness of breath continues, you may need tests and treatment. Watch for any changes in your breathing and other symptoms. Follow-up care is a key part of your treatment and safety. Be sure to make and go to all appointments, and call your doctor if you are having problems. It's also a good idea to know your test results and keep a list of the medicines you take. How can you care for yourself at home? · Do not smoke or allow others to smoke around you. If you need help quitting, talk to your doctor about stop-smoking programs and medicines. These can increase your chances of quitting for good. · Get plenty of rest and sleep. · Take your medicines exactly as prescribed. Call your doctor if you think you are having a problem with your medicine. · Find healthy ways to deal with stress. ? Exercise daily. ? Get plenty of sleep. ? Eat regularly and well. When should you call for help? Call 911 anytime you think you may need emergency care. For example, call if:    · You have severe shortness of breath.     · You have symptoms of a heart attack. These may include:  ? Chest pain or pressure, or a strange feeling in the chest.  ? Sweating. ? Shortness of breath.   ? Nausea or vomiting. ? Pain, pressure, or a strange feeling in the back, neck, jaw, or upper belly or in one or both shoulders or arms. ? Lightheadedness or sudden weakness. ? A fast or irregular heartbeat. After you call 911, the  may tell you to chew 1 adult-strength or 2 to 4 low-dose aspirin. Wait for an ambulance. Do not try to drive yourself. Call your doctor now or seek immediate medical care if:    · Your shortness of breath gets worse or you start to wheeze. Wheezing is a high-pitched sound when you breathe.     · You wake up at night out of breath or have to prop your head up on several pillows to breathe.     · You are short of breath after only light activity or while at rest.   Watch closely for changes in your health, and be sure to contact your doctor if:    · You do not get better over the next 1 to 2 days. Where can you learn more? Go to http://www.gray.com/  Enter S780 in the search box to learn more about \"Shortness of Breath: Care Instructions. \"  Current as of: July 6, 2021               Content Version: 13.0  © 2122-5600 [employer]. Care instructions adapted under license by [employer] (which disclaims liability or warranty for this information). If you have questions about a medical condition or this instruction, always ask your healthcare professional. [place] [address] any warranty or liability for your use of this information.

## 2022-01-04 ENCOUNTER — HOSPITAL ENCOUNTER (OUTPATIENT)
Dept: GENERAL RADIOLOGY | Age: 67
Discharge: HOME | End: 2022-01-04
Payer: MEDICARE

## 2022-01-04 PROCEDURE — 71046 X-RAY EXAM CHEST 2 VIEWS: CPT

## 2022-01-05 NOTE — PROGRESS NOTES
Please call patient and advise Normal WBC count, normal kidneys, liver, electrolytes. Continue with plan discussed in office and follow up with pulmonology.

## 2022-01-05 NOTE — PROGRESS NOTES
Chest x ray showed no changes from previous x ray in September. No sign of pneumonia. Continue with plan discussed in office and follow up with pulmonology.   Bonnie Mcintosh PA-C Detail Level: Zone

## 2022-01-10 NOTE — PROGRESS NOTES
Pt is aware of chest x-ray and states she had a visit with the pulmonologist. She will discuss with Peter ruvalcaba at her vv

## 2022-01-12 ENCOUNTER — VIRTUAL VISIT (OUTPATIENT)
Dept: FAMILY MEDICINE CLINIC | Age: 67
End: 2022-01-12
Payer: MEDICARE

## 2022-01-12 DIAGNOSIS — E78.49 OTHER HYPERLIPIDEMIA: ICD-10-CM

## 2022-01-12 DIAGNOSIS — J45.909 PERSISTENT ASTHMA WITHOUT COMPLICATION, UNSPECIFIED ASTHMA SEVERITY: Primary | ICD-10-CM

## 2022-01-12 DIAGNOSIS — E11.9 TYPE 2 DIABETES MELLITUS WITHOUT COMPLICATION, WITHOUT LONG-TERM CURRENT USE OF INSULIN (HCC): ICD-10-CM

## 2022-01-12 PROCEDURE — 3046F HEMOGLOBIN A1C LEVEL >9.0%: CPT | Performed by: FAMILY MEDICINE

## 2022-01-12 PROCEDURE — 99213 OFFICE O/P EST LOW 20 MIN: CPT | Performed by: FAMILY MEDICINE

## 2022-01-12 PROCEDURE — G8756 NO BP MEASURE DOC: HCPCS | Performed by: FAMILY MEDICINE

## 2022-01-12 PROCEDURE — 3017F COLORECTAL CA SCREEN DOC REV: CPT | Performed by: FAMILY MEDICINE

## 2022-01-12 PROCEDURE — G0463 HOSPITAL OUTPT CLINIC VISIT: HCPCS | Performed by: FAMILY MEDICINE

## 2022-01-12 PROCEDURE — G8427 DOCREV CUR MEDS BY ELIG CLIN: HCPCS | Performed by: FAMILY MEDICINE

## 2022-01-12 PROCEDURE — G8432 DEP SCR NOT DOC, RNG: HCPCS | Performed by: FAMILY MEDICINE

## 2022-01-12 PROCEDURE — 2022F DILAT RTA XM EVC RTNOPTHY: CPT | Performed by: FAMILY MEDICINE

## 2022-01-12 RX ORDER — HYDROXYZINE PAMOATE 25 MG/1
CAPSULE ORAL
COMMUNITY
Start: 2022-01-04 | End: 2022-05-02

## 2022-01-12 RX ORDER — BUDESONIDE, GLYCOPYRROLATE, AND FORMOTEROL FUMARATE 160; 9; 4.8 UG/1; UG/1; UG/1
AEROSOL, METERED RESPIRATORY (INHALATION)
COMMUNITY
End: 2022-05-02

## 2022-01-12 NOTE — PROGRESS NOTES
Chief Complaint   Patient presents with    Wheezing     Pt being seen for wheezing and trouble breathing  Pt has been using her neb. 1. Have you been to the ER, urgent care clinic since your last visit? Hospitalized since your last visit? No    2. Have you seen or consulted any other health care providers outside of the [address] [place] [address] since your last visit? Include any pap smears or colon screening.  No     Pt has no other concerns

## 2022-01-12 NOTE — PROGRESS NOTES
Hardy Morrow (: 1955) is a 61 y.o. female, established patient, here for evaluation of the following chief complaint(s):   Wheezing       ASSESSMENT/PLAN:  Below is the assessment and plan developed based on review of pertinent history, labs, studies, and medications. 1. Persistent asthma without complication, unspecified asthma severity  Significant improvement with changes made by pulmonology. No change to current plan. Discussed with patient it may take longer than 1 week for her to feel back to normal and for the new medications to fully take effect. New inhaler has glycopyrrolate which is likely causing dry mouth and dry throat. Advised patient to sip on water or ice chips to help with dry mouth. Follow up with pulmonology if sx worsen or fail to improve. 2. Type 2 diabetes mellitus without complication, without long-term current use of insulin ([facility] [address]  Records review shows A1C of 6.6 2021. Patient states this was rechecked in May but in unsure what it was and reports she has not been treated for diabetes. Plan to recheck A1C at fasting labs in 1 to 2 months. 3. Other hyperlipidemia  Last labs borderline elevated 2021 per patient. Plan to recheck around 3/2022. Patient will call to schedule fasting lab appt in 1 to 2 months. No follow-ups on file. SUBJECTIVE/OBJECTIVE:  Chief Complaint   Patient presents with    Wheezing     Patient prevents via virtual visit for follow up wheezing. Patient saw pulmonologist last Tuesday after PCP appointment. Pulmonologist added 20mg prednisone to 6day taper pack. Also added hydroxyzine TID and switched inhaler from symbicort to [place]. Added doxycycline x 1 week. Patient reports she feels much better. By Friday of last week patient states she felt her airway was finally openHome O2 has ranged from 95-96%. Wheezing has improved. Continues to note mild inspiratory stridor but this has also improved from 1 week ago.   Notes she is fatigued. Has developed dry mouth and discomfort swallowing. Continues to use nebulizer 3 times per day. After she uses the nebulizer patient reports her breathing feels great. She also was told by pulmonologist to wean off of valium as it will decrease her respirations. Review of Systems   Constitutional: Positive for fatigue. Negative for fever and unexpected weight change. HENT: Negative for hearing loss. Eyes: Negative for pain and visual disturbance. Respiratory: Positive for shortness of breath. Negative for cough and chest tightness. Cardiovascular: Negative for chest pain and palpitations. Gastrointestinal: Negative for abdominal pain. Neurological: Negative for dizziness, light-headedness and headaches. Psychiatric/Behavioral: Negative for agitation and behavioral problems. [INSTRUCTIONS:  \"[x]\" Indicates a positive item  \"[]\" Indicates a negative item  -- DELETE ALL ITEMS NOT EXAMINED]    Constitutional: [x] Appears well-developed and well-nourished [x] No apparent distress      [] Abnormal -     Mental status: [x] Alert and awake  [x] Oriented to person/place/time [x] Able to follow commands    [] Abnormal -     Eyes:   EOM    [x]  Normal    [] Abnormal -   Sclera  [x]  Normal    [] Abnormal -          Discharge [x]  None visible   [] Abnormal -     HENT: [x] Normocephalic, atraumatic  [] Abnormal -   [x] Mouth/Throat: Mucous membranes are moist    External Ears [x] Normal  [] Abnormal -    Neck: [x] No visualized mass [] Abnormal -     Pulmonary/Chest: [x] Respiratory effort normal   [x] No visualized signs of difficulty breathing or respiratory distress        [x] Abnormal - Soft inspiratory wheeze heard when patient asked to take a deep breath. Significantly improved from 1 week ago.        Musculoskeletal:   [] Normal gait with no signs of ataxia         [x] Normal range of motion of neck        [] Abnormal -     Neurological:        [x] No Facial Asymmetry (Cranial nerve 7 motor function) (limited exam due to video visit)          [x] No gaze palsy        [] Abnormal -          Skin:        [x] No significant exanthematous lesions or discoloration noted on facial skin         [] Abnormal -            Psychiatric:       [x] Normal Affect [] Abnormal -        [x] No Hallucinations    Other pertinent observable physical exam findings:-        On this date 01/12/2022 I have spent 15 minutes reviewing previous notes, test results and face to face (virtual) with the patient discussing the diagnosis and importance of compliance with the treatment plan as well as documenting on the day of the visit. Siri Humberto was evaluated through a synchronous (real-time) audio-video encounter. The patient (or guardian if applicable) is aware that this is a billable service. Verbal consent to proceed has been obtained within the past 12 months. The visit was conducted pursuant to the emergency declaration under the [address] [place] [address], [address] [place] [address] authority and the [employer] Act. Patient identification was verified, and a caregiver was present when appropriate. The patient was located in a state where the provider was credentialed to provide care. An electronic signature was used to authenticate this note.   -- Leyda Bates PA-C

## 2022-02-03 ENCOUNTER — TRANSCRIBE ORDER (OUTPATIENT)
Dept: SCHEDULING | Age: 67
End: 2022-02-03

## 2022-02-03 DIAGNOSIS — R06.1 STRIDOR: Primary | ICD-10-CM

## 2022-03-18 PROBLEM — G47.09 OTHER INSOMNIA: Status: ACTIVE | Noted: 2022-01-03

## 2022-03-18 PROBLEM — E11.9 TYPE 2 DIABETES MELLITUS WITHOUT COMPLICATION, WITHOUT LONG-TERM CURRENT USE OF INSULIN (HCC): Status: ACTIVE | Noted: 2022-01-03

## 2022-03-18 PROBLEM — E78.49 OTHER HYPERLIPIDEMIA: Status: ACTIVE | Noted: 2022-01-03

## 2022-03-18 PROBLEM — F41.9 ANXIETY: Status: ACTIVE | Noted: 2021-02-11

## 2022-03-18 PROBLEM — I10 HYPERTENSIVE DISORDER: Status: ACTIVE | Noted: 2021-02-11

## 2022-03-18 PROBLEM — G47.33 OBSTRUCTIVE SLEEP APNEA SYNDROME: Status: ACTIVE | Noted: 2017-09-13

## 2022-03-19 PROBLEM — H91.13 PRESBYCUSIS OF BOTH EARS: Status: ACTIVE | Noted: 2022-01-03

## 2022-03-19 PROBLEM — Z78.0 MENOPAUSE: Status: ACTIVE | Noted: 2021-02-11

## 2022-03-19 PROBLEM — J30.81 ALLERGIC RHINITIS DUE TO ANIMAL HAIR AND DANDER: Status: ACTIVE | Noted: 2022-01-03

## 2022-03-19 PROBLEM — B00.9 HERPES SIMPLEX TYPE 1 ANTIBODY POSITIVE: Status: ACTIVE | Noted: 2021-02-11

## 2022-03-19 PROBLEM — E55.9 VITAMIN D DEFICIENCY: Status: ACTIVE | Noted: 2022-01-03

## 2022-03-20 PROBLEM — K21.9 GASTROESOPHAGEAL REFLUX: Status: ACTIVE | Noted: 2021-02-11

## 2022-03-20 PROBLEM — N76.5 VAGINAL ULCER: Status: ACTIVE | Noted: 2021-02-11

## 2022-03-20 PROBLEM — M15.9 OSTEOARTHRITIS OF MULTIPLE JOINTS: Status: ACTIVE | Noted: 2022-01-03

## 2022-03-20 PROBLEM — J45.909 ASTHMA: Status: ACTIVE | Noted: 2021-02-11

## 2022-04-22 ENCOUNTER — DOCUMENTATION ONLY (OUTPATIENT)
Dept: FAMILY MEDICINE CLINIC | Age: 67
End: 2022-04-22

## 2022-04-22 NOTE — PROGRESS NOTES
[employer] DEXA report was put on [place] [employer] desk
Detail Level: Detailed
Post-Care Instructions: I reviewed with the patient in detail post-care instructions. Patient is to wear sunprotection, and avoid picking at any of the treated lesions. Pt may apply Vaseline to crusted or scabbing areas.
Price (Use Numbers Only, No Special Characters Or $): 0
Consent: The patient's consent was obtained including but not limited to risks of crusting, scabbing, blistering, scarring, darker or lighter pigmentary change, recurrence, incomplete removal and infection.

## 2022-04-26 ENCOUNTER — DOCUMENTATION ONLY (OUTPATIENT)
Dept: FAMILY MEDICINE CLINIC | Age: 67
End: 2022-04-26

## 2022-05-02 ENCOUNTER — OFFICE VISIT (OUTPATIENT)
Dept: FAMILY MEDICINE CLINIC | Age: 67
End: 2022-05-02
Payer: MEDICARE

## 2022-05-02 VITALS
BODY MASS INDEX: 38.17 KG/M2 | HEART RATE: 73 BPM | OXYGEN SATURATION: 96 % | SYSTOLIC BLOOD PRESSURE: 128 MMHG | DIASTOLIC BLOOD PRESSURE: 82 MMHG | WEIGHT: 223.6 LBS | HEIGHT: 64 IN

## 2022-05-02 DIAGNOSIS — I10 PRIMARY HYPERTENSION: ICD-10-CM

## 2022-05-02 DIAGNOSIS — E11.9 TYPE 2 DIABETES MELLITUS WITHOUT COMPLICATION, WITHOUT LONG-TERM CURRENT USE OF INSULIN (HCC): Primary | ICD-10-CM

## 2022-05-02 DIAGNOSIS — E66.01 SEVERE OBESITY (BMI 35.0-39.9) WITH COMORBIDITY (HCC): ICD-10-CM

## 2022-05-02 DIAGNOSIS — Z86.018 HISTORY OF PROLACTINOMA: ICD-10-CM

## 2022-05-02 DIAGNOSIS — F41.9 ANXIETY: ICD-10-CM

## 2022-05-02 DIAGNOSIS — E78.49 OTHER HYPERLIPIDEMIA: ICD-10-CM

## 2022-05-02 DIAGNOSIS — J38.6 SUBGLOTTIC STENOSIS: ICD-10-CM

## 2022-05-02 PROCEDURE — G8752 SYS BP LESS 140: HCPCS | Performed by: FAMILY MEDICINE

## 2022-05-02 PROCEDURE — 1090F PRES/ABSN URINE INCON ASSESS: CPT | Performed by: FAMILY MEDICINE

## 2022-05-02 PROCEDURE — G8427 DOCREV CUR MEDS BY ELIG CLIN: HCPCS | Performed by: FAMILY MEDICINE

## 2022-05-02 PROCEDURE — G8754 DIAS BP LESS 90: HCPCS | Performed by: FAMILY MEDICINE

## 2022-05-02 PROCEDURE — G8400 PT W/DXA NO RESULTS DOC: HCPCS | Performed by: FAMILY MEDICINE

## 2022-05-02 PROCEDURE — G8432 DEP SCR NOT DOC, RNG: HCPCS | Performed by: FAMILY MEDICINE

## 2022-05-02 PROCEDURE — G8536 NO DOC ELDER MAL SCRN: HCPCS | Performed by: FAMILY MEDICINE

## 2022-05-02 PROCEDURE — 2022F DILAT RTA XM EVC RTNOPTHY: CPT | Performed by: FAMILY MEDICINE

## 2022-05-02 PROCEDURE — G8417 CALC BMI ABV UP PARAM F/U: HCPCS | Performed by: FAMILY MEDICINE

## 2022-05-02 PROCEDURE — 3046F HEMOGLOBIN A1C LEVEL >9.0%: CPT | Performed by: FAMILY MEDICINE

## 2022-05-02 PROCEDURE — 1101F PT FALLS ASSESS-DOCD LE1/YR: CPT | Performed by: FAMILY MEDICINE

## 2022-05-02 PROCEDURE — 99214 OFFICE O/P EST MOD 30 MIN: CPT | Performed by: FAMILY MEDICINE

## 2022-05-02 PROCEDURE — 3017F COLORECTAL CA SCREEN DOC REV: CPT | Performed by: FAMILY MEDICINE

## 2022-05-02 RX ORDER — PROPRANOLOL HYDROCHLORIDE 10 MG/1
10 TABLET ORAL
COMMUNITY
End: 2022-05-02 | Stop reason: SDUPTHER

## 2022-05-02 RX ORDER — PROPRANOLOL HYDROCHLORIDE 10 MG/1
10 TABLET ORAL
Qty: 30 TABLET | Refills: 0 | Status: SHIPPED | OUTPATIENT
Start: 2022-05-02 | End: 2022-09-07 | Stop reason: SDUPTHER

## 2022-05-02 RX ORDER — LANCETS
EACH MISCELLANEOUS
Qty: 1 EACH | Refills: 11 | Status: SHIPPED | OUTPATIENT
Start: 2022-05-02

## 2022-05-02 RX ORDER — INSULIN PUMP SYRINGE, 3 ML
EACH MISCELLANEOUS
Qty: 1 KIT | Refills: 0 | Status: SHIPPED | OUTPATIENT
Start: 2022-05-02

## 2022-05-02 RX ORDER — NYSTATIN 100000 [USP'U]/G
POWDER TOPICAL AS NEEDED
COMMUNITY

## 2022-05-02 RX ORDER — BUDESONIDE 0.5 MG/2ML
INHALANT ORAL
COMMUNITY
Start: 2022-04-04

## 2022-05-02 NOTE — PATIENT INSTRUCTIONS
Monitor fasting glucose twice per day.  First thing in the morning and 2 hours after dinner   If <60 call office, if >500 always go to ED

## 2022-05-02 NOTE — PROGRESS NOTES
Verified name and birth date for privacy precautions. Chart reviewed in preparation for today's visit. Chief Complaint   Patient presents with    Hypertension     follow up visit    Diabetes          Health Maintenance Due   Topic    Hepatitis C Screening     COVID-19 Vaccine (1)    Pneumococcal 65+ years (1 - PCV)    Foot Exam Q1     A1C test (Diabetic or Prediabetic)     MICROALBUMIN Q1     Eye Exam Retinal or Dilated     DTaP/Tdap/Td series (1 - Tdap)    Shingrix Vaccine Age 49> (1 of 2)    Breast Cancer Screen Mammogram     Lipid Screen     Medicare Yearly Exam     Bone Densitometry (Dexa) Screening          Wt Readings from Last 3 Encounters:   05/02/22 223 lb 9.6 oz (101.4 kg)   01/03/22 224 lb 4.8 oz (101.7 kg)   02/20/19 216 lb (98 kg)     Temp Readings from Last 3 Encounters:   01/03/22 98.3 °F (36.8 °C)   02/20/19 97.8 °F (36.6 °C)     BP Readings from Last 3 Encounters:   05/02/22 128/82   01/03/22 121/81   02/20/19 115/68     Pulse Readings from Last 3 Encounters:   05/02/22 73   01/03/22 80   02/20/19 66         Learning Assessment:  :     No flowsheet data found. Depression Screening:  :     3 most recent PHQ Screens 5/2/2022   PHQ Not Done -   Little interest or pleasure in doing things Not at all   Feeling down, depressed, irritable, or hopeless Not at all   Total Score PHQ 2 0       Fall Risk Assessment:  :     Fall Risk Assessment, last 12 mths 5/2/2022   Able to walk? Yes   Fall in past 12 months? 0   Do you feel unsteady? 0   Are you worried about falling 0       Abuse Screening:  :     Abuse Screening Questionnaire 1/12/2022   Do you ever feel afraid of your partner? N   Are you in a relationship with someone who physically or mentally threatens you? N   Is it safe for you to go home?  Y       Coordination of Care Questionnaire:  :     1) Have you been to an emergency room, urgent care clinic since your last visit? no   Hospitalized since your last visit? no             2) Have you seen or consulted any other health care providers outside of [address] [place] [address] since your last visit?  Yes; Dr. Sarahy Mosley (pulm) and Dr. Sohan Barker (ENT)  (Include any pap smears or colon screenings in this section.)    3) Do you have an Advance Directive on file? no      Patient is currently accompanied by her self.      ------------------------------------------------

## 2022-05-02 NOTE — PROGRESS NOTES
Jyoti Lee (: 1955) is a 74 y.o. female, established patient, here for evaluation of the following chief complaint(s):  Hypertension (follow up visit) and Diabetes         ASSESSMENT/PLAN:  Below is the assessment and plan developed based on review of pertinent history, physical exam, labs, studies, and medications. 1. Type 2 diabetes mellitus without complication, without long-term current use of insulin (HCC)  Last A1c 6.6 2021, has been diet controlled. Check A1c today   Patient has been on numerous steroids in last 6 mo, last predpack was 2022. She is also having monthly kenalog injections for in trachea for subglottic stenosis  Recommend she start twice daily BG monitoring- fasting AM and 2 hours after dinner-- to obtain accurate monitoring of glucose and how it is affected by kenalog injections   Also update CMP and urine micro today   Patient agreeable to monitor BG-- discussed goal fasting 80 to 130. Call office if <60 and ED for >500. Return 6 weeks (after next kenalog injection) with glucose log-- sooner for consistently high or low readings   -     MICROALBUMIN, UR, RAND W/ MICROALB/CREAT RATIO; Future  -     METABOLIC PANEL, COMPREHENSIVE; Future  -     HEMOGLOBIN A1C WITH EAG; Future  -     DIABETIC FOOT EXAM  -     Blood-Glucose Meter monitoring kit; Use to monitor fasting glucose twice per day. E11.9, Normal, Disp-1 Kit, R-0Dispense insurance preferred brand  -     glucose blood VI test strips (ASCENSIA AUTODISC VI, ONE TOUCH ULTRA TEST VI) strip; Use to monitor fasting glucose twice per day. E11.9, Normal, Disp-100 Strip, R-1Dispense insurance preferred brand  -     lancets misc; Use to monitor fasting glucose twice per day. E11.9, Normal, Disp-1 Each, R-11Dispense insurance preferred brand    2. Primary hypertension  At goal in office   Continue hctz 12.5mg daily   Update labs today   -     METABOLIC PANEL, COMPREHENSIVE; Future  -     CBC W/O DIFF;  Future  -     TSH 3RD GENERATION; Future    3. Other hyperlipidemia  Previously controlled with diet, not currently on statin   LDL goal <70 with T2DM   -     LIPID PANEL; Future    4. Anxiety  Sees psychiatry   Recently stopped all sedating meds including valium and hydroxyzine  Uses propranolol 10mg PRN during social situations or for doctors appointments (dentistry). Has only needed 1 tab in last 30 days   Refill sent for propranolol PRN  -     propranoloL (INDERAL) 10 mg tablet; Take 1 Tablet by mouth daily as needed for Anxiety., Normal, Disp-30 Tablet, R-0    5. Severe obesity (BMI 35.0-39. 9) with comorbidity ([address]  I have reviewed/discussed the above normal BMI with the patient. I have recommended the following interventions: dietary management education, guidance, and counseling . .    -Work on healthy snacking, cut out bread, white potatoes, pasta. Continue limiting sweet tea    6. History of prolactinoma  History of as a teenager with surgical removal.  Previous PCP was monitoring prolactin with yearly labs   Add today   -     PROLACTIN; Future    7. Subglottic stenosis   Managed by ENT Dr. Jon Santacruz in conjunction with pulmonologist Dr. Kady Servin  -Current tx includes kenalog injections monthly. Return in about 6 weeks (around 6/13/2022) for medicare wellness visit, glucose check . SUBJECTIVE/OBJECTIVE:  Chief Complaint   Patient presents with    Hypertension     follow up visit    Diabetes     Patient is a 75 yo female with T2DM, HTN, anxiety, GERD, BAIRON, asthma, hyperlipidemia presenting to office for fasting labs    Of note, patient was diagnosed with subglottic stenosis 2 mo ago. She is currently seeing pulmonologist Dr. Tramaine Ramos and ENT Dr. Jon Santacruz ENT. She had surgical procedure with Dr. Jon Santacruz to remove stenotic tracheal tissue. She is now having kenalog injections in her trachea monthly to further treat stenosis. Next injection is in 2 weeks.  She was told she may need a tracheostomy if the severe stenosis returns which she is ok with. Dr. Moody العلي stopped the zopinex, trazadone, valium, hydroxyzine and singulair. She was told NO sedating medications. They did repeat sleep study and she does have sleep apnea-- continue cpap  They did not repeat PFTs as sx attributed to subglottic stenosis NOT asthma exacerbation. Reports her anxiety has been ok since stopping the above meds. Reports anxiousness with dental procedures. She has used propranolol 10mg for situational anxiety in the past when not taking benzodiazepines or hydroxyzine. For T2DM, she has not been checking blood glucose at home. Last known A1C with previous PCP was 1/2021 at 6.6. She does not check her glucose at home. She was on several steroid tapers from 12/2021 though 2/2021 for what was thought to be an asthma exacerbation. She feels like she has gained weight but with office scales has actually lost 1 lb. She has been cutting out bread and white potatoes. Drinking predominately water-- only 1 glass of sweet tea every 3 days rather than multiple glasses per day. She does snack frequently-- feels she snacks more often after kenalog injections. She got tdap and shingles vaccine at pharmacy recently. She plans to get prevnar from [place].     Mammogram and Dexa were done April 20th. She was told mammogram was normal-- she requested both be sent to PCP. She does have cataracts and plans to have these repaired this fall after treatment for subglottic stenosis is completed, or well controlled. Review of Systems   Constitutional: Negative for fatigue, fever and unexpected weight change. HENT: Negative for hearing loss. Eyes: Negative for pain and visual disturbance. Respiratory: Negative for cough, chest tightness and shortness of breath. Cardiovascular: Negative for chest pain, palpitations and leg swelling. Gastrointestinal: Negative for abdominal distention, abdominal pain, blood in stool, constipation and diarrhea. Endocrine: Negative for cold intolerance, heat intolerance, polydipsia and polyphagia. Genitourinary: Negative for dysuria and menstrual problem. Musculoskeletal: Negative for arthralgias and joint swelling. Neurological: Negative for dizziness, light-headedness and headaches. Psychiatric/Behavioral: Negative for agitation and behavioral problems. Current Outpatient Medications on File Prior to Visit   Medication Sig Dispense Refill    budesonide (PULMICORT) 0.5 mg/2 mL nbsp INHALE 1 VIAL BY NEBULIZER TWICE A DAY      nystatin (MYCOSTATIN) powder Apply  to affected area as needed.  cyanocobalamin (VITAMIN B12) 100 mcg tablet Take 100 mcg by mouth daily.  ascorbic acid, vitamin C, (Vitamin C) 250 mg tablet Take 500 mg by mouth.  zinc sulfate (ZINC-15 PO) Take 50 mg by mouth.  cyclobenzaprine (FLEXERIL) 5 mg tablet Take 5 mg by mouth three (3) times daily as needed for Muscle Spasm(s).  hydroCHLOROthiazide (HYDRODIURIL) 12.5 mg tablet Take 1 Tablet by mouth daily. 90 Tablet 0    triamcinolone acetonide (KENALOG) 0.1 % ointment Apply  to affected area two (2) times a day. use thin layer      Omeprazole delayed release (PRILOSEC D/R) 20 mg tablet Take 40 mg by mouth daily.  estradiol (ESTRACE) 1 mg tablet Take 1 mg by mouth daily.  citalopram (CELEXA) 40 mg tablet Take 40 mg by mouth daily.  busPIRone (BUSPAR) 10 mg tablet Take 10 mg by mouth two (2) times a day.  cholecalciferol, vitamin D3, (VITAMIN D3) 2,000 unit tab Take 5,000 Int'l Units by mouth.  [DISCONTINUED] propranoloL (INDERAL) 10 mg tablet Take 10 mg by mouth daily as needed for Anxiety.  [DISCONTINUED] hydrOXYzine pamoate (VISTARIL) 25 mg capsule  (Patient not taking: Reported on 5/2/2022)      [DISCONTINUED] budesonide-glycopyr-formoterol (Breztri Aerosphere) 160-9-4.8 mcg/actuation HFAA Take  by inhalation. (Patient not taking: Reported on 5/2/2022)      OTHER Pulse Ox.  Use to monitor oxygen saturation daily and when shortness of breath occurs. Dx code: J45.909 1 Units 0    [DISCONTINUED] montelukast (Singulair) 10 mg tablet Take 1 Tablet by mouth daily. (Patient not taking: Reported on 5/2/2022) 90 Tablet 0    [DISCONTINUED] diazePAM (VALIUM) 2 mg tablet Take 2 mg by mouth two (2) times a day. 1mg per day (Patient not taking: Reported on 5/2/2022)      [DISCONTINUED] traZODone (DESYREL) 100 mg tablet Take 100 mg by mouth nightly. (Patient not taking: Reported on 5/2/2022)      [DISCONTINUED] propranolol LA (INDERAL LA) 60 mg SR capsule Take 10 mg by mouth as needed. (Patient not taking: Reported on 5/2/2022)      [DISCONTINUED] levalbuterol (XOPENEX) 1.25 mg/3 mL nebu 1.25 mg by Nebulization route every six (6) hours as needed. (Patient not taking: Reported on 5/2/2022)       No current facility-administered medications on file prior to visit. Patient Active Problem List    Diagnosis Date Noted    Other hyperlipidemia 01/03/2022    Type 2 diabetes mellitus without complication, without long-term current use of insulin ([facility] [address] 01/03/2022    Presbycusis of both ears 01/03/2022    Allergic rhinitis due to animal hair and dander 01/03/2022    Osteoarthritis of multiple joints 01/03/2022    Other insomnia 01/03/2022    Vitamin D deficiency 01/03/2022    Anxiety 02/11/2021    Asthma 02/11/2021    Gastroesophageal reflux 02/11/2021    Herpes simplex type 1 antibody positive 02/11/2021    Hypertensive disorder 02/11/2021    Menopause 02/11/2021    Vaginal ulcer 02/11/2021    Obstructive sleep apnea syndrome 09/13/2017     Allergies   Allergen Reactions    Celebrex [Celecoxib] Contact Dermatitis    Iodinated Contrast Media Shortness of Breath     Short of breath, swelling of mouth, tongue, and throat.     Betadine [Povidone-Iodine] Rash    Neosporin [Benzalkonium Chloride] Rash    Neosporin [Neomycin-Bacitracnzn-Polymyxnb] Contact Dermatitis    Bacitracin-Polymyxin B Rash     Past Surgical History:   Procedure Laterality Date    COLONOSCOPY N/A 2/20/2019    COLONOSCOPY performed by Giuseppe Soto MD at [address] HX COLONOSCOPY      HX GI      last colonoscopy 2015    HX GYN      bladder sling applied and later removed    HX GYN      MIGUEL    HX TUBAL LIGATION      NEUROLOGICAL PROCEDURE UNLISTED      removal of pituitary tumor 1982     No family history on file. Social History     Tobacco Use    Smoking status: Never Smoker    Smokeless tobacco: Never Used   Substance Use Topics    Alcohol use: No    Drug use: No       Vitals:    05/02/22 1035   BP: 128/82   Pulse: 73   SpO2: 96%   Weight: 223 lb 9.6 oz (101.4 kg)   Height: 5' 3.75\" (1.619 m)   PainSc:   0 - No pain        Physical Exam  Constitutional:       General: She is not in acute distress. Appearance: Normal appearance. She is not ill-appearing. HENT:      Head: Normocephalic and atraumatic. Eyes:      Extraocular Movements: Extraocular movements intact. Conjunctiva/sclera: Conjunctivae normal.      Pupils: Pupils are equal, round, and reactive to light. Neck:      Thyroid: No thyroid mass, thyromegaly or thyroid tenderness. Cardiovascular:      Rate and Rhythm: Normal rate and regular rhythm. Pulses: Normal pulses. Heart sounds: Normal heart sounds. No murmur heard. No friction rub. No gallop. Pulmonary:      Effort: Pulmonary effort is normal. No respiratory distress. Breath sounds: Normal breath sounds. No stridor. No wheezing. Abdominal:      General: Abdomen is flat. Bowel sounds are normal. There is no distension. Palpations: Abdomen is soft. There is no mass. Tenderness: There is no abdominal tenderness. There is no guarding. Musculoskeletal:      Cervical back: Normal range of motion and neck supple. No rigidity or tenderness. Right lower leg: No edema. Left lower leg: No edema.    Lymphadenopathy:      Cervical: No cervical adenopathy. Skin:     Capillary Refill: Capillary refill takes less than 2 seconds. Neurological:      General: No focal deficit present. Mental Status: She is alert and oriented to person, place, and time. Psychiatric:         Mood and Affect: Mood normal.         Behavior: Behavior normal.       Diabetic foot exam:     Left Foot:   Visual Exam: normal    Pulse DP: 2+ (normal)   Filament test: normal sensation          Right Foot:   Visual Exam: normal    Pulse DP: 2+ (normal)   Filament test: normal sensation       An electronic signature was used to authenticate this note.   -- Treasure Keating PA-C

## 2022-05-03 LAB
ALBUMIN SERPL-MCNC: 4.1 G/DL (ref 3.8–4.8)
ALBUMIN/CREAT UR: 4 MG/G CREAT (ref 0–29)
ALBUMIN/GLOB SERPL: 1.5 {RATIO} (ref 1.2–2.2)
ALP SERPL-CCNC: 95 IU/L (ref 44–121)
ALT SERPL-CCNC: 22 IU/L (ref 0–32)
AST SERPL-CCNC: 20 IU/L (ref 0–40)
BILIRUB SERPL-MCNC: 0.2 MG/DL (ref 0–1.2)
BUN SERPL-MCNC: 14 MG/DL (ref 8–27)
BUN/CREAT SERPL: 16 (ref 12–28)
CALCIUM SERPL-MCNC: 8.8 MG/DL (ref 8.7–10.3)
CHLORIDE SERPL-SCNC: 97 MMOL/L (ref 96–106)
CHOLEST SERPL-MCNC: 211 MG/DL (ref 100–199)
CO2 SERPL-SCNC: 22 MMOL/L (ref 20–29)
CREAT SERPL-MCNC: 0.85 MG/DL (ref 0.57–1)
CREAT UR-MCNC: 149.8 MG/DL
EGFR: 76 ML/MIN/1.73
ERYTHROCYTE [DISTWIDTH] IN BLOOD BY AUTOMATED COUNT: 12.8 % (ref 11.7–15.4)
EST. AVERAGE GLUCOSE BLD GHB EST-MCNC: 143 MG/DL
GLOBULIN SER CALC-MCNC: 2.7 G/DL (ref 1.5–4.5)
GLUCOSE SERPL-MCNC: 99 MG/DL (ref 65–99)
HBA1C MFR BLD: 6.6 % (ref 4.8–5.6)
HCT VFR BLD AUTO: 39.6 % (ref 34–46.6)
HDLC SERPL-MCNC: 61 MG/DL
HGB BLD-MCNC: 12.9 G/DL (ref 11.1–15.9)
IMP & REVIEW OF LAB RESULTS: NORMAL
LDLC SERPL CALC-MCNC: 133 MG/DL (ref 0–99)
MCH RBC QN AUTO: 31.8 PG (ref 26.6–33)
MCHC RBC AUTO-ENTMCNC: 32.6 G/DL (ref 31.5–35.7)
MCV RBC AUTO: 98 FL (ref 79–97)
MICROALBUMIN UR-MCNC: 6.2 UG/ML
PLATELET # BLD AUTO: 352 X10E3/UL (ref 150–450)
POTASSIUM SERPL-SCNC: 4.2 MMOL/L (ref 3.5–5.2)
PROLACTIN SERPL-MCNC: 13.6 NG/ML (ref 4.8–23.3)
PROT SERPL-MCNC: 6.8 G/DL (ref 6–8.5)
RBC # BLD AUTO: 4.06 X10E6/UL (ref 3.77–5.28)
SODIUM SERPL-SCNC: 143 MMOL/L (ref 134–144)
TRIGL SERPL-MCNC: 98 MG/DL (ref 0–149)
TSH SERPL DL<=0.005 MIU/L-ACNC: 2.21 UIU/ML (ref 0.45–4.5)
VLDLC SERPL CALC-MCNC: 17 MG/DL (ref 5–40)
WBC # BLD AUTO: 9 X10E3/UL (ref 3.4–10.8)

## 2022-05-04 NOTE — PROGRESS NOTES
I called and spoke with patient. Labs show LDL  and total cholesterol are high. With T2DM  and The 10-year ASCVD risk score (Betsy Ramon., et al., 2013) is: 13.7% I do recommend starting a statin medication. However patient refuses statin at this time. Will work hard on dietary changes and exercising. Recheck in 3 months and if LDL remains above goal patient will reconsider statin. Urine micro normal. CMP normal. CBC normal. TSH normal. Prolactin normal.     A1C 6.6 and patient reports glucose fating has ranged 120-135. I do not recommend starting medication now, rather work on the diet changes. However continue monitoring glucose, particularly around the kenalog injections. If begins to rise will recommend metformin. Patient agrees-- will call to follow up sooner elevate glucose readings. Otherwise fu as scheduled in 6 weeks.

## 2022-05-24 ENCOUNTER — TELEPHONE (OUTPATIENT)
Dept: FAMILY MEDICINE CLINIC | Age: 67
End: 2022-05-24

## 2022-05-24 NOTE — TELEPHONE ENCOUNTER
----- Message from Tariq Ramon sent at 5/24/2022  1:50 PM EDT -----  Subject: Referral Request    QUESTIONS   Reason for referral request? Pt phnd needs ENT referral to Dr Aristeo Dent   (GNS#[phone]; fax#[phone])at [facility] - pt states has had 3 appts   with him & now they are telling her she needs the referral for appts to be   covered; please call pt when this has been completed   Has the physician seen you for this condition before? No   Preferred Specialist (if applicable)? Do you already have an appointment scheduled? No  Additional Information for Provider?   ---------------------------------------------------------------------------  --------------  CALL BACK INFO  What is the best way for the office to contact you? OK to leave message on   voicemail  Preferred Call Back Phone Number? [phone]  ---------------------------------------------------------------------------  --------------  SCRIPT ANSWERS  Relationship to Patient?  Self

## 2022-06-15 ENCOUNTER — OFFICE VISIT (OUTPATIENT)
Dept: FAMILY MEDICINE CLINIC | Age: 67
End: 2022-06-15
Payer: MEDICARE

## 2022-06-15 VITALS
HEART RATE: 65 BPM | DIASTOLIC BLOOD PRESSURE: 81 MMHG | WEIGHT: 212 LBS | RESPIRATION RATE: 16 BRPM | SYSTOLIC BLOOD PRESSURE: 127 MMHG | OXYGEN SATURATION: 97 % | BODY MASS INDEX: 41.62 KG/M2 | HEIGHT: 60 IN

## 2022-06-15 DIAGNOSIS — E11.9 TYPE 2 DIABETES MELLITUS WITHOUT COMPLICATION, WITHOUT LONG-TERM CURRENT USE OF INSULIN (HCC): Primary | ICD-10-CM

## 2022-06-15 PROCEDURE — 3044F HG A1C LEVEL LT 7.0%: CPT | Performed by: FAMILY MEDICINE

## 2022-06-15 PROCEDURE — 99214 OFFICE O/P EST MOD 30 MIN: CPT | Performed by: FAMILY MEDICINE

## 2022-06-15 PROCEDURE — G8536 NO DOC ELDER MAL SCRN: HCPCS | Performed by: FAMILY MEDICINE

## 2022-06-15 PROCEDURE — G8432 DEP SCR NOT DOC, RNG: HCPCS | Performed by: FAMILY MEDICINE

## 2022-06-15 PROCEDURE — G8417 CALC BMI ABV UP PARAM F/U: HCPCS | Performed by: FAMILY MEDICINE

## 2022-06-15 PROCEDURE — G8752 SYS BP LESS 140: HCPCS | Performed by: FAMILY MEDICINE

## 2022-06-15 PROCEDURE — 1123F ACP DISCUSS/DSCN MKR DOCD: CPT | Performed by: FAMILY MEDICINE

## 2022-06-15 PROCEDURE — G8754 DIAS BP LESS 90: HCPCS | Performed by: FAMILY MEDICINE

## 2022-06-15 PROCEDURE — 2022F DILAT RTA XM EVC RTNOPTHY: CPT | Performed by: FAMILY MEDICINE

## 2022-06-15 PROCEDURE — 1101F PT FALLS ASSESS-DOCD LE1/YR: CPT | Performed by: FAMILY MEDICINE

## 2022-06-15 PROCEDURE — 1090F PRES/ABSN URINE INCON ASSESS: CPT | Performed by: FAMILY MEDICINE

## 2022-06-15 PROCEDURE — G8427 DOCREV CUR MEDS BY ELIG CLIN: HCPCS | Performed by: FAMILY MEDICINE

## 2022-06-15 PROCEDURE — G8399 PT W/DXA RESULTS DOCUMENT: HCPCS | Performed by: FAMILY MEDICINE

## 2022-06-15 PROCEDURE — 3017F COLORECTAL CA SCREEN DOC REV: CPT | Performed by: FAMILY MEDICINE

## 2022-06-15 PROCEDURE — G9899 SCRN MAM PERF RSLTS DOC: HCPCS | Performed by: FAMILY MEDICINE

## 2022-06-15 NOTE — PROGRESS NOTES
Ender Sorensen (: 1955) is a 74 y.o. female, established patient, here for evaluation of the following chief complaint(s):  Diabetes (follow up)         ASSESSMENT/PLAN:  Below is the assessment and plan developed based on review of pertinent history, physical exam, labs, studies, and medications. 1. Type 2 diabetes mellitus without complication, without long-term current use of insulin (HCC)  Blood sugars remain well controlled after kenalog injections. Few elevated the day after the injections to fasting 150 however otherwise fasting glucose at goal of 80 to 130. Recommend she continue to work on dietary changes and weight loss, she has lost 10 lbs in 6 weeks  Repeat A1C in 8 weeks  Monitor glucose after kenalog injection for subglottic stenosis if she needs another     Return in about 8 weeks (around 8/10/2022) for fasting labs. SUBJECTIVE/OBJECTIVE:  Chief Complaint   Patient presents with    Diabetes     follow up     Patient is a 44-year-old female with type 2 diabetes, hypertension, GERD, sleep apnea, anxiety, subglottic stenosis presenting to the office to follow-up blood sugars. She was receiving monthly Kenalog injections by ENT Dr. Dennis Angel for subglottic stenosis. She was worried about her fasting blood sugars being too high after these injections. She has a log of her blood pressures fasting as well as immediately postprandial in office. Fasting blood sugar ranges 95 to 130s, post prandial 120s to 150s, few 170s. As of right now she is done with Kenalog injections for subglottic stenosis. However she does a follow-up with Dr. Dennis Angel later this week. She denies any vision changes, lightheadedness, chest pain, shortness of breath, or palpitations, sensory changes in her hands or feet, confusion. She has been actively trying to lose weight and eat a heart healthy, low-cholesterol diet. She has cut out all white bread, pasta, potatoes.   She has significantly cut down on her intake of sweets and desserts. She is not actively exercising per se but is working in her garden. Review of systems negative other than mentioned in HPI      Current Outpatient Medications on File Prior to Visit   Medication Sig Dispense Refill    budesonide (PULMICORT) 0.5 mg/2 mL nbsp INHALE 1 VIAL BY NEBULIZER TWICE A DAY      nystatin (MYCOSTATIN) powder Apply  to affected area as needed.  propranoloL (INDERAL) 10 mg tablet Take 1 Tablet by mouth daily as needed for Anxiety. 30 Tablet 0    Blood-Glucose Meter monitoring kit Use to monitor fasting glucose twice per day. E11.9 1 Kit 0    glucose blood VI test strips (ASCENSIA AUTODISC VI, ONE TOUCH ULTRA TEST VI) strip Use to monitor fasting glucose twice per day. E11.9 100 Strip 1    lancets misc Use to monitor fasting glucose twice per day. E11.9 1 Each 11    cyanocobalamin (VITAMIN B12) 100 mcg tablet Take 100 mcg by mouth daily.  ascorbic acid, vitamin C, (Vitamin C) 250 mg tablet Take 500 mg by mouth.  zinc sulfate (ZINC-15 PO) Take 50 mg by mouth.  cyclobenzaprine (FLEXERIL) 5 mg tablet Take 5 mg by mouth three (3) times daily as needed for Muscle Spasm(s).  hydroCHLOROthiazide (HYDRODIURIL) 12.5 mg tablet Take 1 Tablet by mouth daily. 90 Tablet 0    OTHER Pulse Ox. Use to monitor oxygen saturation daily and when shortness of breath occurs. Dx code: J45.909 1 Units 0    triamcinolone acetonide (KENALOG) 0.1 % ointment Apply  to affected area two (2) times a day. use thin layer      Omeprazole delayed release (PRILOSEC D/R) 20 mg tablet Take 40 mg by mouth daily.  estradiol (ESTRACE) 1 mg tablet Take 1 mg by mouth daily.  citalopram (CELEXA) 40 mg tablet Take 40 mg by mouth daily.  busPIRone (BUSPAR) 10 mg tablet Take 10 mg by mouth two (2) times a day.  cholecalciferol, vitamin D3, (VITAMIN D3) 2,000 unit tab Take 5,000 Int'l Units by mouth.        No current facility-administered medications on file prior to visit. Patient Active Problem List    Diagnosis Date Noted    History of prolactinoma 05/02/2022    Other hyperlipidemia 01/03/2022    Type 2 diabetes mellitus without complication, without long-term current use of insulin ([facility] 01/03/2022    Presbycusis of both ears 01/03/2022    Allergic rhinitis due to animal hair and dander 01/03/2022    Osteoarthritis of multiple joints 01/03/2022    Other insomnia 01/03/2022    Vitamin D deficiency 01/03/2022    Anxiety 02/11/2021    Asthma 02/11/2021    Gastroesophageal reflux 02/11/2021    Herpes simplex type 1 antibody positive 02/11/2021    Hypertensive disorder 02/11/2021    Menopause 02/11/2021    Vaginal ulcer 02/11/2021    Obstructive sleep apnea syndrome 09/13/2017     Allergies   Allergen Reactions    Celebrex [Celecoxib] Contact Dermatitis    Iodinated Contrast Media Shortness of Breath     Short of breath, swelling of mouth, tongue, and throat.  Betadine [Povidone-Iodine] Rash    Neosporin [Benzalkonium Chloride] Rash    Neosporin [Neomycin-Bacitracnzn-Polymyxnb] Contact Dermatitis    Bacitracin-Polymyxin B Rash     No family history on file. Past Surgical History:   Procedure Laterality Date    COLONOSCOPY N/A 2/20/2019    COLONOSCOPY performed by Juanito Hannah MD at [address] [facility] HX COLONOSCOPY      HX GI      last colonoscopy 2015    HX GYN      bladder sling applied and later removed    HX GYN      MIGUEL    HX TUBAL LIGATION      NEUROLOGICAL PROCEDURE UNLISTED      removal of pituitary tumor 1982     Social History     Tobacco Use    Smoking status: Never Smoker    Smokeless tobacco: Never Used   Substance Use Topics    Alcohol use: No    Drug use: No     Vitals:    06/15/22 1029   BP: 127/81   Pulse: 65   Resp: 16   SpO2: 97%   Weight: 212 lb (96.2 kg)   Height: 5' (1.524 m)   PainSc:   0 - No pain        Physical Exam  Constitutional:       Appearance: Normal appearance.    HENT: Head: Normocephalic and atraumatic. Eyes:      Extraocular Movements: Extraocular movements intact. Conjunctiva/sclera: Conjunctivae normal.      Pupils: Pupils are equal, round, and reactive to light. Cardiovascular:      Rate and Rhythm: Normal rate and regular rhythm. Pulses: Normal pulses. Heart sounds: Normal heart sounds. Pulmonary:      Effort: Pulmonary effort is normal.      Breath sounds: Normal breath sounds. Abdominal:      General: Abdomen is flat. Bowel sounds are normal.      Palpations: Abdomen is soft. Neurological:      Mental Status: She is alert. Psychiatric:         Mood and Affect: Mood normal.         Behavior: Behavior normal.         An electronic signature was used to authenticate this note.   -- Tayler Espinosa PA-C

## 2022-06-29 DIAGNOSIS — E11.9 TYPE 2 DIABETES MELLITUS WITHOUT COMPLICATION, WITHOUT LONG-TERM CURRENT USE OF INSULIN (HCC): ICD-10-CM

## 2022-06-29 RX ORDER — BLOOD SUGAR DIAGNOSTIC
STRIP MISCELLANEOUS
Qty: 100 STRIP | Refills: 1 | Status: SHIPPED | OUTPATIENT
Start: 2022-06-29 | End: 2022-08-25

## 2022-08-08 DIAGNOSIS — I10 PRIMARY HYPERTENSION: ICD-10-CM

## 2022-08-08 RX ORDER — HYDROCHLOROTHIAZIDE 12.5 MG/1
12.5 TABLET ORAL DAILY
Qty: 90 TABLET | Refills: 0 | Status: SHIPPED | OUTPATIENT
Start: 2022-08-08

## 2022-08-08 RX ORDER — PHENOL/SODIUM PHENOLATE
40 AEROSOL, SPRAY (ML) MUCOUS MEMBRANE DAILY
Qty: 60 TABLET | Refills: 2 | Status: SHIPPED | OUTPATIENT
Start: 2022-08-08 | End: 2022-09-09 | Stop reason: SDUPTHER

## 2022-08-25 DIAGNOSIS — E11.9 TYPE 2 DIABETES MELLITUS WITHOUT COMPLICATION, WITHOUT LONG-TERM CURRENT USE OF INSULIN (HCC): ICD-10-CM

## 2022-08-25 RX ORDER — BLOOD SUGAR DIAGNOSTIC
STRIP MISCELLANEOUS
Qty: 100 STRIP | Refills: 1 | Status: SHIPPED | OUTPATIENT
Start: 2022-08-25

## 2022-09-02 ENCOUNTER — PATIENT OUTREACH (OUTPATIENT)
Dept: CASE MANAGEMENT | Age: 67
End: 2022-09-02

## 2022-09-02 NOTE — PROGRESS NOTES
Ambulatory Care Management Note    Date/Time:  9/2/2022 9:04 AM    This patient was received as a referral from Provider. Ambulatory Care Manager outreached to patient today to offer care management services. Introduction to self and role of care manager provided. Patient accepted care management services at this time. Follow up call scheduled at this time. Patient has Ambulatory Care Manager's contact number for for any questions or concerns.

## 2022-09-07 ENCOUNTER — OFFICE VISIT (OUTPATIENT)
Dept: FAMILY MEDICINE CLINIC | Age: 67
End: 2022-09-07
Payer: MEDICARE

## 2022-09-07 VITALS
BODY MASS INDEX: 42.58 KG/M2 | SYSTOLIC BLOOD PRESSURE: 118 MMHG | OXYGEN SATURATION: 97 % | WEIGHT: 216.9 LBS | TEMPERATURE: 98.2 F | RESPIRATION RATE: 18 BRPM | DIASTOLIC BLOOD PRESSURE: 74 MMHG | HEIGHT: 60 IN | HEART RATE: 73 BPM

## 2022-09-07 DIAGNOSIS — E11.9 TYPE 2 DIABETES MELLITUS WITHOUT COMPLICATION, WITHOUT LONG-TERM CURRENT USE OF INSULIN (HCC): ICD-10-CM

## 2022-09-07 DIAGNOSIS — F41.9 ANXIETY: ICD-10-CM

## 2022-09-07 DIAGNOSIS — E78.49 OTHER HYPERLIPIDEMIA: ICD-10-CM

## 2022-09-07 DIAGNOSIS — Z00.00 INITIAL MEDICARE ANNUAL WELLNESS VISIT: Primary | ICD-10-CM

## 2022-09-07 DIAGNOSIS — I10 PRIMARY HYPERTENSION: ICD-10-CM

## 2022-09-07 PROCEDURE — G8417 CALC BMI ABV UP PARAM F/U: HCPCS | Performed by: FAMILY MEDICINE

## 2022-09-07 PROCEDURE — 3017F COLORECTAL CA SCREEN DOC REV: CPT | Performed by: FAMILY MEDICINE

## 2022-09-07 PROCEDURE — 2022F DILAT RTA XM EVC RTNOPTHY: CPT | Performed by: FAMILY MEDICINE

## 2022-09-07 PROCEDURE — 1090F PRES/ABSN URINE INCON ASSESS: CPT | Performed by: FAMILY MEDICINE

## 2022-09-07 PROCEDURE — G8754 DIAS BP LESS 90: HCPCS | Performed by: FAMILY MEDICINE

## 2022-09-07 PROCEDURE — 1123F ACP DISCUSS/DSCN MKR DOCD: CPT | Performed by: FAMILY MEDICINE

## 2022-09-07 PROCEDURE — 3044F HG A1C LEVEL LT 7.0%: CPT | Performed by: FAMILY MEDICINE

## 2022-09-07 PROCEDURE — G9899 SCRN MAM PERF RSLTS DOC: HCPCS | Performed by: FAMILY MEDICINE

## 2022-09-07 PROCEDURE — G8432 DEP SCR NOT DOC, RNG: HCPCS | Performed by: FAMILY MEDICINE

## 2022-09-07 PROCEDURE — 93000 ELECTROCARDIOGRAM COMPLETE: CPT | Performed by: FAMILY MEDICINE

## 2022-09-07 PROCEDURE — 1101F PT FALLS ASSESS-DOCD LE1/YR: CPT | Performed by: FAMILY MEDICINE

## 2022-09-07 PROCEDURE — G8427 DOCREV CUR MEDS BY ELIG CLIN: HCPCS | Performed by: FAMILY MEDICINE

## 2022-09-07 PROCEDURE — G8536 NO DOC ELDER MAL SCRN: HCPCS | Performed by: FAMILY MEDICINE

## 2022-09-07 PROCEDURE — 99214 OFFICE O/P EST MOD 30 MIN: CPT | Performed by: FAMILY MEDICINE

## 2022-09-07 PROCEDURE — G8752 SYS BP LESS 140: HCPCS | Performed by: FAMILY MEDICINE

## 2022-09-07 PROCEDURE — G8399 PT W/DXA RESULTS DOCUMENT: HCPCS | Performed by: FAMILY MEDICINE

## 2022-09-07 PROCEDURE — G0438 PPPS, INITIAL VISIT: HCPCS | Performed by: FAMILY MEDICINE

## 2022-09-07 RX ORDER — PROPRANOLOL HYDROCHLORIDE 10 MG/1
10 TABLET ORAL
Qty: 30 TABLET | Refills: 0 | Status: SHIPPED | OUTPATIENT
Start: 2022-09-07

## 2022-09-07 RX ORDER — MONTELUKAST SODIUM 10 MG/1
10 TABLET ORAL DAILY
COMMUNITY

## 2022-09-07 NOTE — ACP (ADVANCE CARE PLANNING)
Advance Care Planning     General Advance Care Planning (ACP) Conversation      Date of Conversation: 9/7/2022  Conducted with: Patient with Decision Making Capacity    Healthcare Decision Maker:     Primary Decision Maker: Nacnie Whitman - Daughter - [phone]  Click here to complete [address] including selection of the Healthcare Decision Maker Relationship (ie \"Primary\")  Today we documented Decision Maker(s) consistent with Legal Next of Kin hierarchy.     Content/Action Overview:   Has NO ACP documents/care preferences - refer to ACP Clinical Specialist    Length of Voluntary ACP Conversation in minutes:  <16 minutes (Non-Billable)    Tawana Bueno PA-C

## 2022-09-07 NOTE — PROGRESS NOTES
Lulu Parker is a 74 y.o. female , id x 2(name and ). Reviewed record, history, and  medications. Chief Complaint   Patient presents with    Follow-up    Diabetes    Labs     Fasting today. Medication Refill     Propranolol       Vitals:    22 0945   BP: 118/74   Pulse: 73   Resp: 18   Temp: 98.2 °F (36.8 °C)   TempSrc: Oral   SpO2: 97%   Weight: 216 lb 14.4 oz (98.4 kg)   Height: 5' (1.524 m)       Coordination of Care Questionnaire:   1. Have you been to the ER, urgent care clinic since your last visit? Hospitalized since your last visit? Yes [facility] due to ear infection    2. Have you seen or consulted any other health care providers outside of the [address] [place] [address] since your last visit? Include any pap smears or colon screening.  Yes Pulomonary- Dr. Bobbi Sebastian,  [facility] ENT- Dr. Jason Mtz, Psychiatry -Dr Fox Nobles, Dr. Alexander Mtz, Dental- Dr. Marco Crawford

## 2022-09-07 NOTE — PATIENT INSTRUCTIONS
Medicare Wellness Visit, Female     The best way to live healthy is to have a lifestyle where you eat a well-balanced diet, exercise regularly, limit alcohol use, and quit all forms of tobacco/nicotine, if applicable. Regular preventive services are another way to keep healthy. Preventive services (vaccines, screening tests, monitoring & exams) can help personalize your care plan, which helps you manage your own care. Screening tests can find health problems at the earliest stages, when they are easiest to treat. [facility] follows the current, evidence-based guidelines published by the [facility] Velazco ([facility]) when recommending preventive services for our patients. Because we follow these guidelines, sometimes recommendations change over time as research supports it. (For example, mammograms used to be recommended annually. Even though Medicare will still pay for an annual mammogram, the newer guidelines recommend a mammogram every two years for women of average risk). Of course, you and your doctor may decide to screen more often for some diseases, based on your risk and your co-morbidities (chronic disease you are already diagnosed with). Preventive services for you include:  - Medicare offers their members a free annual wellness visit, which is time for you and your primary care provider to discuss and plan for your preventive service needs. Take advantage of this benefit every year!  -All adults over the age of 72 should receive the recommended pneumonia vaccines. Current USPSTF guidelines recommend a series of two vaccines for the best pneumonia protection.   -All adults should have a flu vaccine yearly and a tetanus vaccine every 10 years.   -All adults age 48 and older should receive the shingles vaccines (series of two vaccines).       -All adults age 38-68 who are overweight should have a diabetes screening test once every three years.   -All adults born between [birth year] and 1963 should be screened once for Hepatitis C.  -Other screening tests and preventive services for persons with diabetes include: an eye exam to screen for diabetic retinopathy, a kidney function test, a foot exam, and stricter control over your cholesterol.   -Cardiovascular screening for adults with routine risk involves an electrocardiogram (ECG) at intervals determined by your doctor.   -Colorectal cancer screenings should be done for adults age 54-65 with no increased risk factors for colorectal cancer. There are a number of acceptable methods of screening for this type of cancer. Each test has its own benefits and drawbacks. Discuss with your doctor what is most appropriate for you during your annual wellness visit. The different tests include: colonoscopy (considered the best screening method), a fecal occult blood test, a fecal DNA test, and sigmoidoscopy.    -A bone mass density test is recommended when a woman turns 65 to screen for osteoporosis. This test is only recommended one time, as a screening. Some providers will use this same test as a disease monitoring tool if you already have osteoporosis. -Breast cancer screenings are recommended every other year for women of normal risk, age 54-69.  -Cervical cancer screenings for women over age 72 are only recommended with certain risk factors.      Here is a list of your current Health Maintenance items (your personalized list of preventive services) with a due date:  Health Maintenance Due   Topic Date Due    Hepatitis C Test  Never done    COVID-19 Vaccine (1) Never done    Eye Exam  Never done    Yearly Flu Vaccine (1) Never done

## 2022-09-07 NOTE — PROGRESS NOTES
Giacomo Caban (: 1955) is a 74 y.o. female, established patient, here for evaluation of the following chief complaint(s):  Follow-up, Diabetes, Labs (Fasting today.), and Medication Refill (Propranolol)         ASSESSMENT/PLAN:  Below is the assessment and plan developed based on review of pertinent history, physical exam, labs, studies, and medications. 1. Primary hypertension  Well-controlled in office today  Continue HCTZ 12.5 mg  -Per patient request for medication needs to be refilled we will send in capsules the tablet is very small difficult for her to see    2. Type 2 diabetes mellitus without complication, without long-term current use of insulin ([facility])  Lab Results   Component Value Date/Time    Hemoglobin A1c 6.6 (H) 2022 12:00 AM     Diet has not been great in the last 2 weeks that she has been on vacation, previous to this she was following stricter diet. She is not exercising currently but plans to start in the near future  -     METABOLIC PANEL, COMPREHENSIVE; Future  -     HEMOGLOBIN A1C WITH EAG; Future    3. Other hyperlipidemia  Lab Results   Component Value Date/Time    Cholesterol, total 211 (H) 2022 12:00 AM    HDL Cholesterol 61 2022 12:00 AM    LDL, calculated 133 (H) 2022 12:00 AM    LDL, calculated 93.6 2010 02:05 PM    VLDL, calculated 17 2022 12:00 AM    VLDL, calculated 17.4 2010 02:05 PM    Triglyceride 98 2022 12:00 AM    CHOL/HDL Ratio 3.2 2010 02:05 PM     LDL goal is less than 70. Patient is working on dietary changes of the last 3 months. Fasting labs today. She is hesitant to start a statin but will consider it if LDL is gone up even further    -     METABOLIC PANEL, COMPREHENSIVE; Future  -     LIPID PANEL; Future    4. Anxiety  Continue as needed propranolol  -     propranoloL (INDERAL) 10 mg tablet;  Take 1 Tablet by mouth daily as needed for Anxiety., Normal, Disp-30 Tablet, R-0    No follow-ups on file.      SUBJECTIVE/OBJECTIVE:  Chief Complaint   Patient presents with    Follow-up    Diabetes    Labs     Fasting today. Medication Refill     Propranolol     Patient presents to office for fasting labs and follow up. Just got back from 2 weeks of family vacation, she used propranolol frequently from anxiety on large family vacation at the [place]. After this she went on a week vacation in [place] visiting her sister. Since getting back from vacation she is sleeping better. Gained a little weight on vacation (only 4lbs). She feels totally refreshed. Cleaning her house again. Washing down cabinets. Reaching up caused some pain in low back-- today is better, no persistent back pain today. She also got a local gym in her area she is going to start going to. Next week going to eye dr Aggie Godinez for cataract in R eye. Vision impaired in R eye, hopes to have surgery now that trach issue is better. 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 She does not need a steroid injection for the last 2 visits, she is just following with them every few months now at this point. She feels significantly improved after treatment for subglottic stenosis. She has been try to follow a heart healthy low-cholesterol diet but did not do well on vacation. She was also exercising on vacation but plans to start with a gym soon. Review of Systems   Constitutional:  Negative for fatigue, fever and unexpected weight change. HENT:  Negative for hearing loss. Eyes:  Negative for pain and visual disturbance. Respiratory:  Negative for cough, chest tightness and shortness of breath. Cardiovascular:  Negative for chest pain, palpitations and leg swelling. Gastrointestinal:  Negative for abdominal distention, abdominal pain, blood in stool, constipation and diarrhea. Endocrine: Negative for cold intolerance, heat intolerance, polydipsia and polyphagia.    Genitourinary:  Negative for dysuria and menstrual problem. Musculoskeletal:  Negative for arthralgias and joint swelling. Neurological:  Negative for dizziness, light-headedness and headaches. Psychiatric/Behavioral:  Negative for agitation and behavioral problems. Current Outpatient Medications on File Prior to Visit   Medication Sig Dispense Refill    montelukast (Singulair) 10 mg tablet Take 10 mg by mouth daily. Omeprazole delayed release (PRILOSEC D/R) 20 mg tablet Take 2 Tablets by mouth in the morning. 60 Tablet 2    hydroCHLOROthiazide (HYDRODIURIL) 12.5 mg tablet Take 1 Tablet by mouth in the morning. 90 Tablet 0    budesonide (PULMICORT) 0.5 mg/2 mL nbsp INHALE 1 VIAL BY NEBULIZER TWICE A DAY      nystatin (MYCOSTATIN) powder Apply  to affected area as needed. cyanocobalamin (VITAMIN B12) 100 mcg tablet Take 100 mcg by mouth daily. ascorbic acid, vitamin C, (VITAMIN C) 250 mg tablet Take 500 mg by mouth. zinc sulfate (ZINC-15 PO) Take 50 mg by mouth. cyclobenzaprine (FLEXERIL) 5 mg tablet Take 5 mg by mouth three (3) times daily as needed for Muscle Spasm(s). OTHER Pulse Ox. Use to monitor oxygen saturation daily and when shortness of breath occurs. Dx code: J45.909 1 Units 0    triamcinolone acetonide (KENALOG) 0.1 % ointment Apply  to affected area two (2) times a day. use thin layer      estradiol (ESTRACE) 1 mg tablet Take 1 mg by mouth daily. citalopram (CELEXA) 40 mg tablet Take 40 mg by mouth daily. busPIRone (BUSPAR) 10 mg tablet Take 10 mg by mouth daily. cholecalciferol, vitamin D3, 50 mcg (2,000 unit) tab Take 5,000 Int'l Units by mouth. OneTouch Ultra Test strip USE TO MONITOR FASTING GLUCOSE TWICE PER DAY. E11.9 100 Strip 1    Blood-Glucose Meter monitoring kit Use to monitor fasting glucose twice per day. E11.9 1 Kit 0    lancets misc Use to monitor fasting glucose twice per day.  E11.9 1 Each 11    [DISCONTINUED] propranoloL (INDERAL) 10 mg tablet Take 1 Tablet by mouth daily as needed for Anxiety. 30 Tablet 0     No current facility-administered medications on file prior to visit. Vitals:    09/07/22 0945   BP: 118/74   Pulse: 73   Resp: 18   Temp: 98.2 °F (36.8 °C)   TempSrc: Oral   SpO2: 97%   Weight: 216 lb 14.4 oz (98.4 kg)   Height: 5' (1.524 m)   PainSc:   0 - No pain        Physical Exam  Constitutional:       Appearance: Normal appearance. HENT:      Head: Normocephalic and atraumatic. Eyes:      Extraocular Movements: Extraocular movements intact. Conjunctiva/sclera: Conjunctivae normal.      Pupils: Pupils are equal, round, and reactive to light. Cardiovascular:      Rate and Rhythm: Normal rate and regular rhythm. Pulses: Normal pulses. Heart sounds: Normal heart sounds. Pulmonary:      Effort: Pulmonary effort is normal.      Breath sounds: Normal breath sounds. Abdominal:      General: Abdomen is flat. Bowel sounds are normal.      Palpations: Abdomen is soft. Musculoskeletal:      Cervical back: No rigidity. Right lower leg: No edema. Left lower leg: No edema. Lymphadenopathy:      Cervical: No cervical adenopathy. Neurological:      Mental Status: She is alert. Psychiatric:         Mood and Affect: Mood normal.         Behavior: Behavior normal.           An electronic signature was used to authenticate this note. -- Lauren Colindres PA-C       This is an Initial Medicare Annual Wellness Exam (AWV) (Performed 12 months after IPPE or effective date of Medicare Part B enrollment, Once in a lifetime)    I have reviewed the patient's medical history in detail and updated the computerized patient record. Assessment/Plan   Education and counseling provided:  Are appropriate based on today's review and evaluation  Pneumococcal Vaccine  Screening Mammography  Screening Pap and pelvic (covered once every 2 years)  Colorectal cancer screening tests  Bone mass measurement (DEXA)    1.  Initial Medicare annual wellness visit  -     [facility] POC EKG ROUTINE W/ 12 LEADS, SCREEN ()  -     REFERRAL TO [facility] CLINICAL SPECIALIST  Welcome to Medicare EKG sinus rhythm 61 bpm.  QS complexes of low voltage throughout possible pulmonary disease, patient has known asthma and obstructive sleep apnea and follows with pulmonology.  -Also some flattening of T waves in lead III but upright in 3 and aVF, probably normal.  She has no concerning symptoms of ischemia.  -She is up-to-date on her mammogram, DEXA and colonoscopy. Is up-to-date on all vaccinations.  -Refer to [facility] clinical specialist       Depression Risk Factor Screening     3 most recent PHQ Screens 9/7/2022   PHQ Not Done -   Little interest or pleasure in doing things Not at all   Feeling down, depressed, irritable, or hopeless Not at all   Total Score PHQ 2 0       Alcohol & Drug Abuse Risk Screen    Do you average more than 1 drink per night or more than 7 drinks a week:  No    On any one occasion in the past three months have you have had more than 3 drinks containing alcohol:  No          Functional Ability and Level of Safety    Hearing: The patient wears hearing aids. Activities of Daily Living: The home contains: handrails and grab bars  Patient does total self care     Ambulation: with no difficulty      Fall Risk:  Fall Risk Assessment, last 12 mths 9/7/2022   Able to walk? Yes   Fall in past 12 months? 0   Do you feel unsteady?  0   Are you worried about falling 0   Is the gait abnormal? -      Abuse Screen:  Patient is not abused       Cognitive Screening    Has your family/caregiver stated any concerns about your memory: no-- minor forgetfullness      Cognitive Screening: Normal - Mini Cog Test    Health Maintenance Due     Health Maintenance Due   Topic Date Due    Hepatitis C Screening  Never done    COVID-19 Vaccine (1) Never done    Eye Exam Retinal or Dilated  Never done    Flu Vaccine (1) Never done       Patient Care Team   Patient Care Team:  Benton Gaspar MEGHANN as PCP - General (Physician Assistant)  Feliberto Lowery PA-C as PCP - [facility]  Debra Daniels MD (Pulmonary Disease)  Amalia Olson MD (Otolaryngology)  Miladis Mcneill, MAX as Ambulatory Care Manager    History     Patient Active Problem List   Diagnosis Code    Anxiety F41.9    Asthma J45.909    Gastroesophageal reflux K21.9    Herpes simplex type 1 antibody positive B00.9    Hypertensive disorder I10    Menopause Z78.0    Vaginal ulcer N76.5    Obstructive sleep apnea syndrome G47.33    Other hyperlipidemia E78.49    Type 2 diabetes mellitus without complication, without long-term current use of insulin ([facility]) E11.9    Presbycusis of both ears H91.13    Allergic rhinitis due to animal hair and dander J30.81    Osteoarthritis of multiple joints M15.9    Other insomnia G47.09    Vitamin D deficiency E55.9    History of prolactinoma Z86.018     Past Medical History:   Diagnosis Date    Anxiety 2/11/2021    Arthritis     osteo    Asthma     Diabetes ([address]     Borderline diabetes    Gastroesophageal reflux 2/11/2021    GERD (gastroesophageal reflux disease)     Herpes simplex type 1 antibody positive 2/11/2021    Hypertensive disorder 2/11/2021    Ill-defined condition     Left ventricular heart hypertension without heart failure    Ill-defined condition     spondylosis of lumbar    Ill-defined condition     abnormality of gait, uses walking cane    Ill-defined condition     insomnia    Ill-defined condition     vitamin D deficiency    Ill-defined condition     hearing loss wears, hearing aids bilat    Menopause 2/11/2021    Psychiatric disorder     anxiety, depression    Sleep apnea     Uses C-Pap machine at night    Vaginal ulcer 2/11/2021      Past Surgical History:   Procedure Laterality Date    COLONOSCOPY N/A 2/20/2019    COLONOSCOPY performed by Rolando Borjas MD at [address] [place] [address] 10    HX COLONOSCOPY      HX GI      last colonoscopy 2015    HX GYN      bladder sling applied and later removed    HX GYN      MIGUEL    HX TUBAL LIGATION      NEUROLOGICAL PROCEDURE UNLISTED      removal of pituitary tumor 1982     Current Outpatient Medications   Medication Sig Dispense Refill    montelukast (Singulair) 10 mg tablet Take 10 mg by mouth daily. propranoloL (INDERAL) 10 mg tablet Take 1 Tablet by mouth daily as needed for Anxiety. 30 Tablet 0    Omeprazole delayed release (PRILOSEC D/R) 20 mg tablet Take 2 Tablets by mouth in the morning. 60 Tablet 2    hydroCHLOROthiazide (HYDRODIURIL) 12.5 mg tablet Take 1 Tablet by mouth in the morning. 90 Tablet 0    budesonide (PULMICORT) 0.5 mg/2 mL nbsp INHALE 1 VIAL BY NEBULIZER TWICE A DAY      nystatin (MYCOSTATIN) powder Apply  to affected area as needed. cyanocobalamin (VITAMIN B12) 100 mcg tablet Take 100 mcg by mouth daily. ascorbic acid, vitamin C, (VITAMIN C) 250 mg tablet Take 500 mg by mouth. zinc sulfate (ZINC-15 PO) Take 50 mg by mouth. cyclobenzaprine (FLEXERIL) 5 mg tablet Take 5 mg by mouth three (3) times daily as needed for Muscle Spasm(s). OTHER Pulse Ox. Use to monitor oxygen saturation daily and when shortness of breath occurs. Dx code: J45.909 1 Units 0    triamcinolone acetonide (KENALOG) 0.1 % ointment Apply  to affected area two (2) times a day. use thin layer      estradiol (ESTRACE) 1 mg tablet Take 1 mg by mouth daily. citalopram (CELEXA) 40 mg tablet Take 40 mg by mouth daily. busPIRone (BUSPAR) 10 mg tablet Take 10 mg by mouth daily. cholecalciferol, vitamin D3, 50 mcg (2,000 unit) tab Take 5,000 Int'l Units by mouth. OneTouch Ultra Test strip USE TO MONITOR FASTING GLUCOSE TWICE PER DAY. E11.9 100 Strip 1    Blood-Glucose Meter monitoring kit Use to monitor fasting glucose twice per day. E11.9 1 Kit 0    lancets misc Use to monitor fasting glucose twice per day.  E11.9 1 Each 11     Allergies   Allergen Reactions    Celebrex [Celecoxib] Contact Dermatitis    Iodinated Contrast Media Shortness of Breath     Short of breath, swelling of mouth, tongue, and throat. Betadine [Povidone-Iodine] Rash    Neosporin [Benzalkonium Chloride] Rash    Neosporin [Neomycin-Bacitracnzn-Polymyxnb] Contact Dermatitis    Bacitracin-Polymyxin B Rash       No family history on file.   Social History     Tobacco Use    Smoking status: Never    Smokeless tobacco: Never   Substance Use Topics    Alcohol use: No       Kellee Zhou PA-C

## 2022-09-08 ENCOUNTER — PATIENT OUTREACH (OUTPATIENT)
Dept: CASE MANAGEMENT | Age: 67
End: 2022-09-08

## 2022-09-08 LAB
ALBUMIN SERPL-MCNC: 4.3 G/DL (ref 3.8–4.8)
ALBUMIN/GLOB SERPL: 1.6 {RATIO} (ref 1.2–2.2)
ALP SERPL-CCNC: 98 IU/L (ref 44–121)
ALT SERPL-CCNC: 19 IU/L (ref 0–32)
AST SERPL-CCNC: 17 IU/L (ref 0–40)
BILIRUB SERPL-MCNC: 0.3 MG/DL (ref 0–1.2)
BUN SERPL-MCNC: 10 MG/DL (ref 8–27)
BUN/CREAT SERPL: 14 (ref 12–28)
CALCIUM SERPL-MCNC: 9 MG/DL (ref 8.7–10.3)
CHLORIDE SERPL-SCNC: 101 MMOL/L (ref 96–106)
CHOLEST SERPL-MCNC: 219 MG/DL (ref 100–199)
CO2 SERPL-SCNC: 25 MMOL/L (ref 20–29)
CREAT SERPL-MCNC: 0.71 MG/DL (ref 0.57–1)
EGFR: 94 ML/MIN/1.73
EST. AVERAGE GLUCOSE BLD GHB EST-MCNC: 137 MG/DL
GLOBULIN SER CALC-MCNC: 2.7 G/DL (ref 1.5–4.5)
GLUCOSE SERPL-MCNC: 109 MG/DL (ref 65–99)
HBA1C MFR BLD: 6.4 % (ref 4.8–5.6)
HDLC SERPL-MCNC: 60 MG/DL
IMP & REVIEW OF LAB RESULTS: NORMAL
LDLC SERPL CALC-MCNC: 132 MG/DL (ref 0–99)
POTASSIUM SERPL-SCNC: 4.3 MMOL/L (ref 3.5–5.2)
PROT SERPL-MCNC: 7 G/DL (ref 6–8.5)
SODIUM SERPL-SCNC: 143 MMOL/L (ref 134–144)
TRIGL SERPL-MCNC: 152 MG/DL (ref 0–149)
VLDLC SERPL CALC-MCNC: 27 MG/DL (ref 5–40)

## 2022-09-08 NOTE — PROGRESS NOTES
Please call patient and advise   Labs show kidney, liver and electrolytes are great other than glucose being high. Cholesterol has stayed the same compared to 4 months ago. The goal LDL is <70 with T2DM and hers is 132. I do recommend starting a statin medication such as atorvastatin. I know she has been resistant to this in the past, would she consider starting one now to lower cholesterol and lower future risk of heart attack or stroke? A1C remains well controlled at 6.4. No  need to start medication at this time. Continue heart healthy diet and working on increasing exercise at the new gym! If she starts statin, recheck labs 8 weeks. Otherwise recheck labs 6 months.    The 10-year ASCVD risk score (Sharona Clark., et al., 2013) is: 12.1%    Vineet Martin PA-C

## 2022-09-08 NOTE — ACP (ADVANCE CARE PLANNING)
Advance Care Planning   Ambulatory ACP Specialist Patient Outreach    Date:  9/8/2022, 09/13/22 09/19/22        ACP Specialist:  Laci Romo LPN    Outreach call to patient in follow-up to ACP Specialist referral from:    [x] PCP  [] Provider   [] Ambulatory Care Management [] Other     For:                  [x] Advance Directive Assistance              [] Complete Portable DNR order              [] Complete POST/MOST              [] Code Status Discussion             [x] Discuss Goals of Care             [] Early ACP Decision-Making              [] Other (Specify)    Date Referral Received: 9/7/22    Today's Outreach:  [x] First   [x] Second  [x] Third       Third outreach made by: [] Phone  [x] Email / mail    [] [employer]     Intervention:  [] Spoke with Patient   [x] Left VM requesting return call      Outcome: The first attempt was made to contact pt regarding the ACP referral. No answer on mobile phone. VM left requesting a return call. Will attempt second outreach within one week     Next Step:   [] ACP scheduled conversation  [x] Outreach again in one week               [x] Email / Mail [address]  [] Email / Mail Advance Directive   [x] Closing referral.  Routing closure to referring provider/staff and to ACP Specialist . [x] Closure letter mailed to patient with invitation to contact ACP Specialist if / when ready. Thank you for this referral.    09/13/22     The second attempt was made to contact pt regarding ACP referral. No answer on mobile phone. VM left requesting a return call. Will outreach again within one week.    09/19/22  The final attempt was made to reach the pt. ACP documents sent to pt via e-mail. LPN's contact information was included. We will close the referral at this time.

## 2022-09-09 ENCOUNTER — PATIENT OUTREACH (OUTPATIENT)
Dept: CASE MANAGEMENT | Age: 67
End: 2022-09-09

## 2022-09-09 ENCOUNTER — TELEPHONE (OUTPATIENT)
Dept: FAMILY MEDICINE CLINIC | Age: 67
End: 2022-09-09

## 2022-09-09 DIAGNOSIS — K21.9 GASTROESOPHAGEAL REFLUX DISEASE, UNSPECIFIED WHETHER ESOPHAGITIS PRESENT: ICD-10-CM

## 2022-09-09 DIAGNOSIS — E78.49 OTHER HYPERLIPIDEMIA: Primary | ICD-10-CM

## 2022-09-09 RX ORDER — ESTRADIOL 1 MG/1
1 TABLET ORAL DAILY
Qty: 90 TABLET | Refills: 1 | Status: SHIPPED | OUTPATIENT
Start: 2022-09-09

## 2022-09-09 RX ORDER — PHENOL/SODIUM PHENOLATE
40 AEROSOL, SPRAY (ML) MUCOUS MEMBRANE DAILY
Qty: 60 TABLET | Refills: 2 | Status: SHIPPED | OUTPATIENT
Start: 2022-09-09

## 2022-09-09 RX ORDER — ATORVASTATIN CALCIUM 20 MG/1
20 TABLET, FILM COATED ORAL DAILY
Qty: 90 TABLET | Refills: 0 | Status: SHIPPED | OUTPATIENT
Start: 2022-09-09

## 2022-09-09 NOTE — TELEPHONE ENCOUNTER
----- Message from Venu Sanchez PA-C sent at 9/8/2022  4:23 PM EDT -----  Please call patient and advise   Labs show kidney, liver and electrolytes are great other than glucose being high. Cholesterol has stayed the same compared to 4 months ago. The goal LDL is <70 with T2DM and hers is 132. I do recommend starting a statin medication such as atorvastatin. I know she has been resistant to this in the past, would she consider starting one now to lower cholesterol and lower future risk of heart attack or stroke? A1C remains well controlled at 6.4. No  need to start medication at this time. Continue heart healthy diet and working on increasing exercise at the new gym! If she starts statin, recheck labs 8 weeks. Otherwise recheck labs 6 months.    The 10-year ASCVD risk score (Gen Piña, et al., 2013) is: 12.1%    Venu Sanchez PA-C

## 2022-09-09 NOTE — PROGRESS NOTES
Ambulatory Care Management Note    Date/Time:  9/9/2022 2:23 PM    This Ambulatory Care Manager (ACM) reviewed and updated the following screenings during this call; general assessment, self management assessment, SDOH assessments, and medication reconciliation     Patient's challenges to self-management identified:   lack of knowledge about disease and utilization of services      Medication Management:  good adherence and good understanding    Advance Care Planning:   Does patient have an Advance Directive:   did not discuss this call          Health Maintenance Due   Topic Date Due    Hepatitis C Screening  Never done    COVID-19 Vaccine (1) Never done    Eye Exam Retinal or Dilated  Never done    Flu Vaccine (1) Never done         Patient was asked to consider health care goals that they would like to focus on with this ACM. ACM will follow up with patient to discuss goals and establish care plan in the next 7-14 days. Patient started statin med this week.     PCP/Specialist follow up:   Future Appointments   Date Time Provider Nubia Crabtree   11/9/2022  9:45 AM Kassie Ponce PA-C [facility]

## 2022-09-09 NOTE — TELEPHONE ENCOUNTER
Called and spoke with pt. Pt verified full name and birthdate. Relayed the message per Afshan Henry PA-C. She verbalized understanding of lab results. She agrees to start Atorvastatin medication and would like it sent to [facility] pharmacy. She also requests a refill on Omeprazole and Estradiol as well. 8 week follow up scheduled for 11/9/22 at 0945.

## 2022-09-13 ENCOUNTER — PATIENT OUTREACH (OUTPATIENT)
Dept: CASE MANAGEMENT | Age: 67
End: 2022-09-13

## 2022-09-19 ENCOUNTER — PATIENT OUTREACH (OUTPATIENT)
Dept: CASE MANAGEMENT | Age: 67
End: 2022-09-19

## 2022-09-29 ENCOUNTER — PATIENT OUTREACH (OUTPATIENT)
Dept: CASE MANAGEMENT | Age: 67
End: 2022-09-29

## 2022-10-11 ENCOUNTER — PATIENT OUTREACH (OUTPATIENT)
Dept: CASE MANAGEMENT | Age: 67
End: 2022-10-11

## 2022-10-18 ENCOUNTER — PATIENT OUTREACH (OUTPATIENT)
Dept: CASE MANAGEMENT | Age: 67
End: 2022-10-18

## 2022-11-09 ENCOUNTER — OFFICE VISIT (OUTPATIENT)
Dept: FAMILY MEDICINE CLINIC | Age: 67
End: 2022-11-09
Payer: MEDICARE

## 2022-11-09 VITALS
RESPIRATION RATE: 18 BRPM | HEART RATE: 75 BPM | BODY MASS INDEX: 42.17 KG/M2 | SYSTOLIC BLOOD PRESSURE: 100 MMHG | OXYGEN SATURATION: 97 % | HEIGHT: 60 IN | TEMPERATURE: 97.9 F | WEIGHT: 214.8 LBS | DIASTOLIC BLOOD PRESSURE: 67 MMHG

## 2022-11-09 DIAGNOSIS — E78.49 OTHER HYPERLIPIDEMIA: Primary | ICD-10-CM

## 2022-11-09 DIAGNOSIS — R06.2 WHEEZING: ICD-10-CM

## 2022-11-09 PROBLEM — J39.8 TRACHEAL STENOSIS: Status: ACTIVE | Noted: 2022-11-09

## 2022-11-09 PROCEDURE — G8754 DIAS BP LESS 90: HCPCS | Performed by: FAMILY MEDICINE

## 2022-11-09 PROCEDURE — 3017F COLORECTAL CA SCREEN DOC REV: CPT | Performed by: FAMILY MEDICINE

## 2022-11-09 PROCEDURE — 99214 OFFICE O/P EST MOD 30 MIN: CPT | Performed by: FAMILY MEDICINE

## 2022-11-09 PROCEDURE — 3078F DIAST BP <80 MM HG: CPT | Performed by: FAMILY MEDICINE

## 2022-11-09 PROCEDURE — G8399 PT W/DXA RESULTS DOCUMENT: HCPCS | Performed by: FAMILY MEDICINE

## 2022-11-09 PROCEDURE — G9899 SCRN MAM PERF RSLTS DOC: HCPCS | Performed by: FAMILY MEDICINE

## 2022-11-09 PROCEDURE — 1123F ACP DISCUSS/DSCN MKR DOCD: CPT | Performed by: FAMILY MEDICINE

## 2022-11-09 PROCEDURE — 1101F PT FALLS ASSESS-DOCD LE1/YR: CPT | Performed by: FAMILY MEDICINE

## 2022-11-09 PROCEDURE — G8536 NO DOC ELDER MAL SCRN: HCPCS | Performed by: FAMILY MEDICINE

## 2022-11-09 PROCEDURE — 1090F PRES/ABSN URINE INCON ASSESS: CPT | Performed by: FAMILY MEDICINE

## 2022-11-09 PROCEDURE — G8752 SYS BP LESS 140: HCPCS | Performed by: FAMILY MEDICINE

## 2022-11-09 PROCEDURE — G8432 DEP SCR NOT DOC, RNG: HCPCS | Performed by: FAMILY MEDICINE

## 2022-11-09 PROCEDURE — 3074F SYST BP LT 130 MM HG: CPT | Performed by: FAMILY MEDICINE

## 2022-11-09 PROCEDURE — G8417 CALC BMI ABV UP PARAM F/U: HCPCS | Performed by: FAMILY MEDICINE

## 2022-11-09 PROCEDURE — G8427 DOCREV CUR MEDS BY ELIG CLIN: HCPCS | Performed by: FAMILY MEDICINE

## 2022-11-09 RX ORDER — IPRATROPIUM BROMIDE 0.5 MG/2.5ML
SOLUTION RESPIRATORY (INHALATION)
COMMUNITY
Start: 2022-11-01

## 2022-11-09 NOTE — PROGRESS NOTES
Ivis Mishra (: 1955) is a 74 y.o. female, established patient, here for evaluation of the following chief complaint(s):  Follow-up and Labs         ASSESSMENT/PLAN:  Below is the assessment and plan developed based on review of pertinent history, physical exam, labs, studies, and medications. 1. Other hyperlipidemia  Lab Results   Component Value Date/Time    Cholesterol, total 219 (H) 2022 12:00 AM    HDL Cholesterol 60 2022 12:00 AM    LDL, calculated 132 (H) 2022 12:00 AM    LDL, calculated 93.6 2010 02:05 PM    VLDL, calculated 27 2022 12:00 AM    VLDL, calculated 17.4 2010 02:05 PM    Triglyceride 152 (H) 2022 12:00 AM    CHOL/HDL Ratio 3.2 2010 02:05 PM     Tolerating atorvastatin well   LDL <70 with T2DM  Recheck fasting labs today   -     LIPID PANEL; Future  -     METABOLIC PANEL, COMPREHENSIVE; Future    2. Wheezing  Resolved with levaquin, lungs are CTA today   Recommend starting zyrtec or claritin   Continue pulmocort nebulizer for tracheal stenosis and asthma   Follow up with pulmonology for repeat PFTs and continued management of asthma     Return for 4 to 6 months . SUBJECTIVE/OBJECTIVE:  Chief Complaint   Patient presents with    Follow-up    Labs     Patient presents to office for lab follow up. In last 3 weeks she was having wheezing, SOB. She saw Dr. Jeanette Lin ENT and they said trach was open. She went to see Dr. Kristy Gonzalez pulmonologist, there was slight wheezing. She went to get chest xray ordered by pulmonology. At [facility] they did not have a tech to do cxray or order. She ended up going to [facility] ED for wheezing. ED provider she saw knew ENT provider. They did ED workup and and it was all normal, cardiac testing was OK per patient. She got small dose of xanax which calmed her down. Then she got 3 days of low dose prednisone (20mg). She did not feel right even with prednisone.    Care nurse came and she prescribed levaquin prescription and atrovent nebulizer. She is still using atrovent. Symptoms, productive cough resolved and wheezing has significantly improved. She still using Atrovent inhaler once a day. Wheezing is very much tied now to when she goes around the dog lives in her house if she stays in her room or her she shed she is okay. She has a pulmonologist and they are planning on repeating PFTs for asthma. Pulmonologist stopped his Singulair, she tried Allegra but this made her too dry and she plans to try different antihistamine. She is here to follow up atorvastatin. She has started taking lipitor. She has cut back on coffee, now only decaf. No muscle aches. Consistent with taking Lipitor  Daily. BP readings are perfect--- she is relaxed and not on edge at visit. No low readings at home. No lightheaded on standing. she has been having some feelings of sadness of being overwhelmed a lot of health issues, interpersonal relationship issues with her boyfriend. she is seeing her psychiatrist on Friday. No thoughts of harming her self or others. Appointment is traveling this weekend and she is looking forward to some of the time. Review of systems negative other than mentioned in HPI    Current Outpatient Medications on File Prior to Visit   Medication Sig Dispense Refill    ipratropium (ATROVENT) 0.02 % soln USE 1 VIAL VIA NEBULIZER EVERY 6 HOURS      atorvastatin (LIPITOR) 20 mg tablet Take 1 Tablet by mouth daily. 90 Tablet 0    Omeprazole delayed release (PRILOSEC D/R) 20 mg tablet Take 2 Tablets by mouth daily. 60 Tablet 2    estradioL (ESTRACE) 1 mg tablet Take 1 Tablet by mouth daily. 90 Tablet 1    propranoloL (INDERAL) 10 mg tablet Take 1 Tablet by mouth daily as needed for Anxiety. 30 Tablet 0    hydroCHLOROthiazide (HYDRODIURIL) 12.5 mg tablet Take 1 Tablet by mouth in the morning.  90 Tablet 0    budesonide (PULMICORT) 0.5 mg/2 mL nbsp INHALE 1 VIAL BY NEBULIZER TWICE A DAY      nystatin (MYCOSTATIN) powder Apply  to affected area as needed. cyanocobalamin (VITAMIN B12) 100 mcg tablet Take 100 mcg by mouth daily. ascorbic acid, vitamin C, (VITAMIN C) 250 mg tablet Take 500 mg by mouth. zinc sulfate (ZINC-15 PO) Take 50 mg by mouth.      triamcinolone acetonide (KENALOG) 0.1 % ointment Apply  to affected area two (2) times a day. use thin layer      citalopram (CELEXA) 40 mg tablet Take 40 mg by mouth daily. busPIRone (BUSPAR) 10 mg tablet Take 10 mg by mouth daily. cholecalciferol, vitamin D3, 50 mcg (2,000 unit) tab Take 5,000 Int'l Units by mouth. [DISCONTINUED] montelukast (SINGULAIR) 10 mg tablet Take 10 mg by mouth daily. (Patient not taking: Reported on 11/9/2022)      OneTouch Ultra Test strip USE TO MONITOR FASTING GLUCOSE TWICE PER DAY. E11.9 100 Strip 1    Blood-Glucose Meter monitoring kit Use to monitor fasting glucose twice per day. E11.9 1 Kit 0    lancets misc Use to monitor fasting glucose twice per day. E11.9 1 Each 11    OTHER Pulse Ox. Use to monitor oxygen saturation daily and when shortness of breath occurs. Dx code: J45.909 1 Units 0    [DISCONTINUED] cyclobenzaprine (FLEXERIL) 5 mg tablet Take 5 mg by mouth three (3) times daily as needed for Muscle Spasm(s). (Patient not taking: Reported on 11/9/2022)       No current facility-administered medications on file prior to visit.      Patient Active Problem List    Diagnosis Date Noted    History of prolactinoma 05/02/2022    Other hyperlipidemia 01/03/2022    Type 2 diabetes mellitus without complication, without long-term current use of insulin ([facility] [address] 01/03/2022    Presbycusis of both ears 01/03/2022    Allergic rhinitis due to animal hair and dander 01/03/2022    Osteoarthritis of multiple joints 01/03/2022    Other insomnia 01/03/2022    Vitamin D deficiency 01/03/2022    Anxiety 02/11/2021    Asthma 02/11/2021    Gastroesophageal reflux 02/11/2021    Herpes simplex type 1 antibody positive 02/11/2021    Hypertensive disorder 02/11/2021    Menopause 02/11/2021    Vaginal ulcer 02/11/2021    Obstructive sleep apnea syndrome 09/13/2017     Allergies   Allergen Reactions    Celebrex [Celecoxib] Contact Dermatitis    Iodinated Contrast Media Shortness of Breath     Short of breath, swelling of mouth, tongue, and throat. Betadine [Povidone-Iodine] Rash    Neosporin [Benzalkonium Chloride] Rash    Neosporin [Neomycin-Bacitracnzn-Polymyxnb] Contact Dermatitis    Bacitracin-Polymyxin B Rash     Past Surgical History:   Procedure Laterality Date    COLONOSCOPY N/A 2/20/2019    COLONOSCOPY performed by Jayden Rosario MD at [facility] ENDOSCOPY    HX COLONOSCOPY      HX GI      last colonoscopy 2015    HX GYN      bladder sling applied and later removed    HX GYN      MIGUEL    HX TUBAL LIGATION      NEUROLOGICAL PROCEDURE UNLISTED      removal of pituitary tumor 1982     Social History     Tobacco Use    Smoking status: Never    Smokeless tobacco: Never   Vaping Use    Vaping Use: Unknown   Substance Use Topics    Alcohol use: No    Drug use: No     No family history on file. Vitals:    11/09/22 0939   BP: 100/67   Pulse: 75   Resp: 18   Temp: 97.9 °F (36.6 °C)   TempSrc: Oral   SpO2: 97%   Weight: 214 lb 12.8 oz (97.4 kg)   Height: 5' (1.524 m)   PainSc:   0 - No pain        Physical Exam  Constitutional:       Appearance: Normal appearance. HENT:      Head: Normocephalic and atraumatic. Eyes:      Extraocular Movements: Extraocular movements intact. Conjunctiva/sclera: Conjunctivae normal.      Pupils: Pupils are equal, round, and reactive to light. Cardiovascular:      Rate and Rhythm: Normal rate and regular rhythm. Pulses: Normal pulses. Heart sounds: Normal heart sounds. Pulmonary:      Effort: Pulmonary effort is normal. No respiratory distress. Breath sounds: Normal breath sounds. No wheezing or rales. Abdominal:      General: Abdomen is flat.  Bowel sounds are normal. Palpations: Abdomen is soft. Musculoskeletal:      Cervical back: Normal range of motion. Right lower leg: No edema. Left lower leg: No edema. Neurological:      Mental Status: She is alert. Psychiatric:         Mood and Affect: Mood normal.         Behavior: Behavior normal.       An electronic signature was used to authenticate this note.   -- Arnaud Gilliland PA-C

## 2022-11-09 NOTE — PROGRESS NOTES
Giselle Soas is a 74 y.o. female , id x 2(name and ). Reviewed record, history, and  medications. Chief Complaint   Patient presents with    Follow-up    Labs         Vitals:    22 0939   BP: 100/67   Pulse: 75   Resp: 18   Temp: 97.9 °F (36.6 °C)   TempSrc: Oral   SpO2: 97%   Weight: 214 lb 12.8 oz (97.4 kg)   Height: 5' (1.524 m)       Coordination of Care Questionnaire:   1. Have you been to the ER, urgent care clinic since your last visit? Hospitalized since your last visit? Yes [facility] ER 10/24/22     2. Have you seen or consulted any other health care providers outside of the [address] [place] [address] since your last visit? Include any pap smears or colon screening.  No

## 2022-11-10 LAB
ALBUMIN SERPL-MCNC: 4.1 G/DL (ref 3.8–4.8)
ALBUMIN/GLOB SERPL: 1.5 {RATIO} (ref 1.2–2.2)
ALP SERPL-CCNC: 114 IU/L (ref 44–121)
ALT SERPL-CCNC: 17 IU/L (ref 0–32)
AST SERPL-CCNC: 17 IU/L (ref 0–40)
BILIRUB SERPL-MCNC: 0.3 MG/DL (ref 0–1.2)
BUN SERPL-MCNC: 12 MG/DL (ref 8–27)
BUN/CREAT SERPL: 16 (ref 12–28)
CALCIUM SERPL-MCNC: 8.9 MG/DL (ref 8.7–10.3)
CHLORIDE SERPL-SCNC: 102 MMOL/L (ref 96–106)
CHOLEST SERPL-MCNC: 153 MG/DL (ref 100–199)
CO2 SERPL-SCNC: 24 MMOL/L (ref 20–29)
CREAT SERPL-MCNC: 0.77 MG/DL (ref 0.57–1)
EGFR: 86 ML/MIN/1.73
GLOBULIN SER CALC-MCNC: 2.8 G/DL (ref 1.5–4.5)
GLUCOSE SERPL-MCNC: 127 MG/DL (ref 70–99)
HDLC SERPL-MCNC: 52 MG/DL
IMP & REVIEW OF LAB RESULTS: NORMAL
LDLC SERPL CALC-MCNC: 79 MG/DL (ref 0–99)
POTASSIUM SERPL-SCNC: 4.6 MMOL/L (ref 3.5–5.2)
PROT SERPL-MCNC: 6.9 G/DL (ref 6–8.5)
SODIUM SERPL-SCNC: 141 MMOL/L (ref 134–144)
TRIGL SERPL-MCNC: 123 MG/DL (ref 0–149)
VLDLC SERPL CALC-MCNC: 22 MG/DL (ref 5–40)

## 2022-11-10 NOTE — PROGRESS NOTES
Spoke with pt. Pt id x 2, notified as per Damion Bhagat PA-C and verbalized understanding. 4 mo follow up scheduled.

## 2022-11-10 NOTE — PROGRESS NOTES
Disregard previous result note stating \"flu and strep negative\" as this was an error-STEVE Zamora LPN please call patient and advise: labs show cholesterol is much better, slightly above the goal of LDL <70 with T2DM but I recommend we continue the current 20mg lipitor and recheck in 6 months. Kidney, liver and electrolytes look great other than glucose being a little high which is expected with T2DM, A1C will be due for recheck in about 1 to 3 months. I recommend she follow up 4 months for A1C.    Bard Kriss PA-C

## 2022-11-15 ENCOUNTER — PATIENT OUTREACH (OUTPATIENT)
Dept: CASE MANAGEMENT | Age: 67
End: 2022-11-15

## 2022-11-15 NOTE — PROGRESS NOTES
Patient has graduated from the Complex Case Management  program on 11/15/22  Unable to contact patient further. No further Ambulatory Care Manager follow up scheduled. Goals Addressed    None         Patient has Ambulatory Care Manager's contact information for any further questions, concerns, or needs.   Patients upcoming visits:    Future Appointments   Date Time Provider Nubia Crabtree   3/10/2023  9:20 AM Kassie Ponce PA-C [facility]

## 2022-11-21 ENCOUNTER — TELEPHONE (OUTPATIENT)
Dept: FAMILY MEDICINE CLINIC | Age: 67
End: 2022-11-21

## 2022-11-21 DIAGNOSIS — K21.9 GASTROESOPHAGEAL REFLUX DISEASE, UNSPECIFIED WHETHER ESOPHAGITIS PRESENT: ICD-10-CM

## 2022-11-21 RX ORDER — PHENOL/SODIUM PHENOLATE
40 AEROSOL, SPRAY (ML) MUCOUS MEMBRANE DAILY
Qty: 60 TABLET | Refills: 2 | Status: SHIPPED | OUTPATIENT
Start: 2022-11-21

## 2022-11-21 NOTE — TELEPHONE ENCOUNTER
----- Message from [facility] sent at 11/21/2022  4:40 PM EST -----  Subject: Refill Request    QUESTIONS  Name of Medication? Omeprazole delayed release (PRILOSEC D/R) 20 mg tablet  Patient-reported dosage and instructions? 40 MG every day  How many days do you have left? 1  Preferred Pharmacy? [id number]  Pharmacy phone number (if available)? [phone]  Additional Information for Provider? Patient would like a refill for   medication, she forgot to request it at her last doctor's visit.   ---------------------------------------------------------------------------  --------------  [address]  What is the best way for the office to contact you? OK to leave message on   voicemail  Preferred Call Back Phone Number? [phone]  ---------------------------------------------------------------------------  --------------  SCRIPT ANSWERS  Relationship to Patient?  Self

## 2022-11-26 DIAGNOSIS — E78.49 OTHER HYPERLIPIDEMIA: ICD-10-CM

## 2022-11-28 RX ORDER — ATORVASTATIN CALCIUM 20 MG/1
TABLET, FILM COATED ORAL
Qty: 90 TABLET | Refills: 0 | Status: SHIPPED | OUTPATIENT
Start: 2022-11-28

## 2022-12-05 ENCOUNTER — OFFICE VISIT (OUTPATIENT)
Dept: FAMILY MEDICINE CLINIC | Age: 67
End: 2022-12-05
Payer: MEDICARE

## 2022-12-05 VITALS
HEIGHT: 60 IN | HEART RATE: 76 BPM | TEMPERATURE: 98.2 F | BODY MASS INDEX: 43.72 KG/M2 | OXYGEN SATURATION: 98 % | SYSTOLIC BLOOD PRESSURE: 120 MMHG | WEIGHT: 222.7 LBS | DIASTOLIC BLOOD PRESSURE: 76 MMHG | RESPIRATION RATE: 16 BRPM

## 2022-12-05 DIAGNOSIS — H25.89 OTHER AGE-RELATED CATARACT OF RIGHT EYE: Primary | ICD-10-CM

## 2022-12-05 PROCEDURE — G8536 NO DOC ELDER MAL SCRN: HCPCS | Performed by: FAMILY MEDICINE

## 2022-12-05 PROCEDURE — 1090F PRES/ABSN URINE INCON ASSESS: CPT | Performed by: FAMILY MEDICINE

## 2022-12-05 PROCEDURE — G8752 SYS BP LESS 140: HCPCS | Performed by: FAMILY MEDICINE

## 2022-12-05 PROCEDURE — G9899 SCRN MAM PERF RSLTS DOC: HCPCS | Performed by: FAMILY MEDICINE

## 2022-12-05 PROCEDURE — 1101F PT FALLS ASSESS-DOCD LE1/YR: CPT | Performed by: FAMILY MEDICINE

## 2022-12-05 PROCEDURE — G8399 PT W/DXA RESULTS DOCUMENT: HCPCS | Performed by: FAMILY MEDICINE

## 2022-12-05 PROCEDURE — G8417 CALC BMI ABV UP PARAM F/U: HCPCS | Performed by: FAMILY MEDICINE

## 2022-12-05 PROCEDURE — 3074F SYST BP LT 130 MM HG: CPT | Performed by: FAMILY MEDICINE

## 2022-12-05 PROCEDURE — G8427 DOCREV CUR MEDS BY ELIG CLIN: HCPCS | Performed by: FAMILY MEDICINE

## 2022-12-05 PROCEDURE — G8432 DEP SCR NOT DOC, RNG: HCPCS | Performed by: FAMILY MEDICINE

## 2022-12-05 PROCEDURE — 3078F DIAST BP <80 MM HG: CPT | Performed by: FAMILY MEDICINE

## 2022-12-05 PROCEDURE — G8754 DIAS BP LESS 90: HCPCS | Performed by: FAMILY MEDICINE

## 2022-12-05 PROCEDURE — 3017F COLORECTAL CA SCREEN DOC REV: CPT | Performed by: FAMILY MEDICINE

## 2022-12-05 PROCEDURE — 99213 OFFICE O/P EST LOW 20 MIN: CPT | Performed by: FAMILY MEDICINE

## 2022-12-05 PROCEDURE — 1123F ACP DISCUSS/DSCN MKR DOCD: CPT | Performed by: FAMILY MEDICINE

## 2022-12-05 NOTE — PROGRESS NOTES
Leonie Paredes is a 74 y.o. female , id x 2(name and ). Reviewed record, history, and  medications. Chief Complaint   Patient presents with    Pre-op Exam     (R) cataract surgery on 22. Vitals:    22 0953   BP: 120/76   Pulse: 76   Resp: 16   Temp: 98.2 °F (36.8 °C)   TempSrc: Oral   SpO2: 98%   Weight: 222 lb 11.2 oz (101 kg)   Height: 5' (1.524 m)       Coordination of Care Questionnaire:   1. Have you been to the ER, urgent care clinic since your last visit? Hospitalized since your last visit? No    2. Have you seen or consulted any other health care providers outside of the [address] [place] [address] since your last visit? Include any pap smears or colon screening.  No

## 2022-12-05 NOTE — PROGRESS NOTES
Preoperative Evaluation    Date of Exam: 2022    Reece Claude is a 74 y.o. female (:1955) who presents for preoperative evaluation.    Procedure/Surgery: R cataract surgery    Date of Procedure/Surgery: 2022  Surgeon: Pal Echeverriaippo   [facility]  [facility]   Primary Physician: Micaela Robert PA-C  Latex Allergy: no    Problem List:     Patient Active Problem List    Diagnosis Date Noted    Tracheal stenosis 2022    History of prolactinoma 2022    Other hyperlipidemia 2022    Type 2 diabetes mellitus without complication, without long-term current use of insulin ([address] 2022    Presbycusis of both ears 2022    Allergic rhinitis due to animal hair and dander 2022    Osteoarthritis of multiple joints 2022    Other insomnia 2022    Vitamin D deficiency 2022    Anxiety 2021    Asthma 2021    Gastroesophageal reflux 2021    Herpes simplex type 1 antibody positive 2021    Hypertensive disorder 2021    Menopause 2021    Vaginal ulcer 2021    Obstructive sleep apnea syndrome 2017     Medical History:     Past Medical History:   Diagnosis Date    Anxiety 2021    Arthritis     osteo    Asthma     Diabetes ([address]     Borderline diabetes    Gastroesophageal reflux 2021    GERD (gastroesophageal reflux disease)     Herpes simplex type 1 antibody positive 2021    Hypertensive disorder 2021    Ill-defined condition     Left ventricular heart hypertension without heart failure    Ill-defined condition     spondylosis of lumbar    Ill-defined condition     abnormality of gait, uses walking cane    Ill-defined condition     insomnia    Ill-defined condition     vitamin D deficiency    Ill-defined condition     hearing loss wears, hearing aids bilat    Menopause 2021    Psychiatric disorder     anxiety, depression    Sleep apnea     Uses C-Pap machine at night    Vaginal ulcer 2/11/2021     Allergies: Allergies   Allergen Reactions    Celebrex [Celecoxib] Contact Dermatitis    Iodinated Contrast Media Shortness of Breath     Short of breath, swelling of mouth, tongue, and throat. Betadine [Povidone-Iodine] Rash    Neosporin [Benzalkonium Chloride] Rash    Neosporin [Neomycin-Bacitracnzn-Polymyxnb] Contact Dermatitis    Bacitracin-Polymyxin B Rash      Medications:     Current Outpatient Medications   Medication Sig    atorvastatin (LIPITOR) 20 mg tablet TAKE 1 TABLET BY MOUTH EVERY DAY    Omeprazole delayed release (PRILOSEC D/R) 20 mg tablet Take 2 Tablets by mouth daily. estradioL (ESTRACE) 1 mg tablet Take 1 Tablet by mouth daily. propranoloL (INDERAL) 10 mg tablet Take 1 Tablet by mouth daily as needed for Anxiety. hydroCHLOROthiazide (HYDRODIURIL) 12.5 mg tablet Take 1 Tablet by mouth in the morning. budesonide (PULMICORT) 0.5 mg/2 mL nbsp INHALE 1 VIAL BY NEBULIZER TWICE A DAY    cyanocobalamin (VITAMIN B12) 100 mcg tablet Take 100 mcg by mouth daily. ascorbic acid, vitamin C, (VITAMIN C) 250 mg tablet Take 500 mg by mouth. zinc sulfate (ZINC-15 PO) Take 50 mg by mouth.    triamcinolone acetonide (KENALOG) 0.1 % ointment Apply  to affected area two (2) times a day. use thin layer    citalopram (CELEXA) 40 mg tablet Take 40 mg by mouth daily. busPIRone (BUSPAR) 10 mg tablet Take 10 mg by mouth two (2) times a day. cholecalciferol, vitamin D3, 50 mcg (2,000 unit) tab Take 5,000 Int'l Units by mouth. ipratropium (ATROVENT) 0.02 % soln USE 1 VIAL VIA NEBULIZER EVERY 6 HOURS (Patient not taking: Reported on 12/5/2022)    OneTouch Ultra Test strip USE TO MONITOR FASTING GLUCOSE TWICE PER DAY. E11.9    nystatin (MYCOSTATIN) powder Apply  to affected area as needed. (Patient not taking: Reported on 12/5/2022)    Blood-Glucose Meter monitoring kit Use to monitor fasting glucose twice per day.  E11.9    lancets misc Use to monitor fasting glucose twice per day. E11.9    OTHER Pulse Ox. Use to monitor oxygen saturation daily and when shortness of breath occurs. Dx code: J45.909     No current facility-administered medications for this visit. Surgical History:     Past Surgical History:   Procedure Laterality Date    COLONOSCOPY N/A 2/20/2019    COLONOSCOPY performed by Anna Oleary MD at [facility] ENDOSCOPY    HX COLONOSCOPY      HX GI      last colonoscopy 2015    HX GYN      bladder sling applied and later removed    HX GYN      MIGUEL    HX TUBAL LIGATION      NEUROLOGICAL PROCEDURE UNLISTED      removal of pituitary tumor 1982     Social History:     Social History     Socioeconomic History    Marital status:    Tobacco Use    Smoking status: Never    Smokeless tobacco: Never   Vaping Use    Vaping Use: Unknown   Substance and Sexual Activity    Alcohol use: No    Drug use: No       Recent use of: No recent use of aspirin (ASA), NSAIDS or steroids    Tetanus up to date: last tetanus booster within 10 years      Anesthesia Complications: None  History of abnormal bleeding : None  History of Blood Transfusions: no  Health Care Directive or Living Will: no, she has not made it yet but has the paperwork     REVIEW OF SYSTEMS:  Review of Systems   Constitutional:  Negative for chills, fever, malaise/fatigue and weight loss. HENT:  Negative for ear pain and hearing loss. Eyes:  Negative for blurred vision and double vision. Respiratory:  Negative for cough and shortness of breath. Cardiovascular:  Negative for chest pain, palpitations and leg swelling. Gastrointestinal:  Negative for constipation, diarrhea, heartburn, nausea and vomiting. Genitourinary:  Negative for dysuria and frequency. Musculoskeletal:  Negative for joint pain and myalgias. Skin:  Negative for rash. Neurological:  Negative for dizziness, loss of consciousness, weakness and headaches. Psychiatric/Behavioral:  Negative for depression.  The patient is not nervous/anxious. EXAM:   Visit Vitals  /76 (BP 1 Location: Left upper arm, BP Patient Position: Sitting)   Pulse 76   Temp 98.2 °F (36.8 °C) (Oral)   Resp 16   Ht 5' (1.524 m)   Wt 222 lb 11.2 oz (101 kg)   SpO2 98%   BMI 43.49 kg/m²     Physical Exam  Constitutional:       Appearance: Normal appearance. HENT:      Head: Normocephalic and atraumatic. Right Ear: Tympanic membrane, ear canal and external ear normal.      Left Ear: Tympanic membrane, ear canal and external ear normal.   Eyes:      Extraocular Movements: Extraocular movements intact. Conjunctiva/sclera: Conjunctivae normal.      Pupils: Pupils are equal, round, and reactive to light. Cardiovascular:      Rate and Rhythm: Normal rate and regular rhythm. Pulses: Normal pulses. Heart sounds: Normal heart sounds. Pulmonary:      Effort: Pulmonary effort is normal.      Breath sounds: Normal breath sounds. Abdominal:      General: Abdomen is flat. Bowel sounds are normal.      Palpations: Abdomen is soft. Musculoskeletal:      Cervical back: Normal range of motion. No rigidity. Right lower leg: No edema. Left lower leg: No edema. Neurological:      General: No focal deficit present. Mental Status: She is alert and oriented to person, place, and time.    Psychiatric:         Mood and Affect: Mood normal.         Behavior: Behavior normal.       DIAGNOSTICS:   1. Labs:   Lab Results   Component Value Date/Time    WBC 9.0 05/02/2022 12:00 AM    HGB 12.9 05/02/2022 12:00 AM    HCT 39.6 05/02/2022 12:00 AM    PLATELET 258 95/34/3829 12:00 AM    MCV 98 (H) 05/02/2022 12:00 AM     Lab Results   Component Value Date/Time    Sodium 141 11/09/2022 12:00 AM    Potassium 4.6 11/09/2022 12:00 AM    Chloride 102 11/09/2022 12:00 AM    CO2 24 11/09/2022 12:00 AM    Anion gap 5 01/03/2022 10:54 AM    Glucose 127 (H) 11/09/2022 12:00 AM    BUN 12 11/09/2022 12:00 AM    Creatinine 0.77 11/09/2022 12:00 AM    BUN/Creatinine ratio 16 11/09/2022 12:00 AM    GFR est AA >60 01/03/2022 10:54 AM    GFR est non-AA >60 01/03/2022 10:54 AM    Calcium 8.9 11/09/2022 12:00 AM    Bilirubin, total 0.3 11/09/2022 12:00 AM    ALT (SGPT) 17 11/09/2022 12:00 AM    Alk. phosphatase 114 11/09/2022 12:00 AM    Protein, total 6.9 11/09/2022 12:00 AM    Albumin 4.1 11/09/2022 12:00 AM    Globulin 3.5 01/03/2022 10:54 AM    A-G Ratio 1.5 11/09/2022 12:00 AM     Diagnoses and all orders for this visit:    1.  Other age-related cataract of right eye    IMPRESSION:   Risk factors for complications include Diabetes mellitus, hypertension, BAIRON however all are well controlled with current treatments  No contraindications to planned surgery  Note and preop will be faxed to surgeon     Lashae Gonsales PA-C   12/5/2022

## 2022-12-06 ENCOUNTER — TELEPHONE (OUTPATIENT)
Dept: FAMILY MEDICINE CLINIC | Age: 67
End: 2022-12-06

## 2022-12-06 NOTE — TELEPHONE ENCOUNTER
----- Message from Naomi Bamberger sent at 12/6/2022 10:30 AM EST -----  Subject: Message to Provider    QUESTIONS  Information for Provider? PT WAS SEEN 12-5-2022 FOR A PRE OP APPT AND AND   SURGEON HAS NOT GOTTEN THE PHYSICAL REPORT YET PLEASE FAX ASAP IF ANY   QUESTION  ---------------------------------------------------------------------------  --------------  Lino Resendez KVKX  [phone]; OK to leave message on voicemail  ---------------------------------------------------------------------------  --------------  SCRIPT ANSWERS  Relationship to Patient?  Self

## 2022-12-07 NOTE — TELEPHONE ENCOUNTER
Called and spoke with pt. Pt id x 2. Informed her preop form has been faxed. Confirmation received. She verbalized understanding.

## 2022-12-07 NOTE — PROGRESS NOTES
Faxed office note and preop form to [facility] (fax# [phone]). Confirmation received.

## 2022-12-21 RX ORDER — OMEPRAZOLE 40 MG/1
CAPSULE, DELAYED RELEASE ORAL
Qty: 30 CAPSULE | Refills: 2 | Status: SHIPPED | OUTPATIENT
Start: 2022-12-21

## 2023-01-03 DIAGNOSIS — I10 PRIMARY HYPERTENSION: ICD-10-CM

## 2023-01-04 RX ORDER — HYDROCHLOROTHIAZIDE 12.5 MG/1
12.5 CAPSULE ORAL DAILY
Qty: 90 CAPSULE | Refills: 1 | Status: SHIPPED | OUTPATIENT
Start: 2023-01-04

## 2023-01-04 RX ORDER — HYDROCHLOROTHIAZIDE 12.5 MG/1
12.5 TABLET ORAL DAILY
Qty: 90 TABLET | Refills: 0 | Status: CANCELLED | OUTPATIENT
Start: 2023-01-04

## 2023-02-09 ENCOUNTER — TELEPHONE (OUTPATIENT)
Dept: FAMILY MEDICINE CLINIC | Age: 68
End: 2023-02-09

## 2023-02-09 NOTE — TELEPHONE ENCOUNTER
Called and spoke with pt. Pt id x 2. Informed her the appt would need to be rescheduled with another provider. She verbalized understanding. Pt states she would like to see Dr. Rigo Leigh. Appt rescheduled with Dr. Rigo Leigh: 3/8/23 at 11:00 am. Pt verbalized understanding. Pt wants to let Luis Deshpande know that she's going to miss her and appreciates all that she's done for her.

## 2023-02-09 NOTE — TELEPHONE ENCOUNTER
----- Message from Tanisha Shah sent at 2/9/2023  3:18 PM EST -----  Subject: Message to Provider    QUESTIONS  Information for Provider? Yeimi Mariee heard that David Guzman was leaving   the practice and has an upcoming appointment 3/10/2023 and would like to   know what the office is going to do. Is she going to see Kayaqian Zohra on   3/10/2023 or will she need to reschedule and with who. Please call   Yeimi Mariee back ASA. I was not able to confirm her call information, she   disconnected before it was completed. ---------------------------------------------------------------------------  --------------  Aisha Giles [facility]  [phone]; Do not leave any message, patient will call back for answer  ---------------------------------------------------------------------------  --------------  SCRIPT ANSWERS  Relationship to Patient?  Self

## 2023-03-08 ENCOUNTER — OFFICE VISIT (OUTPATIENT)
Dept: FAMILY MEDICINE CLINIC | Age: 68
End: 2023-03-08
Payer: MEDICARE

## 2023-03-08 VITALS
HEART RATE: 74 BPM | SYSTOLIC BLOOD PRESSURE: 131 MMHG | DIASTOLIC BLOOD PRESSURE: 79 MMHG | OXYGEN SATURATION: 93 % | WEIGHT: 223 LBS | RESPIRATION RATE: 14 BRPM | BODY MASS INDEX: 43.78 KG/M2 | HEIGHT: 60 IN | TEMPERATURE: 98 F

## 2023-03-08 DIAGNOSIS — E11.9 TYPE 2 DIABETES MELLITUS WITHOUT COMPLICATION, WITHOUT LONG-TERM CURRENT USE OF INSULIN (HCC): Primary | ICD-10-CM

## 2023-03-08 DIAGNOSIS — G47.09 OTHER INSOMNIA: ICD-10-CM

## 2023-03-08 DIAGNOSIS — E78.00 HIGH CHOLESTEROL: ICD-10-CM

## 2023-03-08 DIAGNOSIS — R42 VERTIGO: ICD-10-CM

## 2023-03-08 DIAGNOSIS — E66.01 OBESITY, CLASS III, BMI 40-49.9 (MORBID OBESITY) (HCC): ICD-10-CM

## 2023-03-08 PROCEDURE — G8510 SCR DEP NEG, NO PLAN REQD: HCPCS | Performed by: FAMILY MEDICINE

## 2023-03-08 PROCEDURE — 2022F DILAT RTA XM EVC RTNOPTHY: CPT | Performed by: FAMILY MEDICINE

## 2023-03-08 PROCEDURE — G8427 DOCREV CUR MEDS BY ELIG CLIN: HCPCS | Performed by: FAMILY MEDICINE

## 2023-03-08 PROCEDURE — G8399 PT W/DXA RESULTS DOCUMENT: HCPCS | Performed by: FAMILY MEDICINE

## 2023-03-08 PROCEDURE — G8417 CALC BMI ABV UP PARAM F/U: HCPCS | Performed by: FAMILY MEDICINE

## 2023-03-08 PROCEDURE — G9899 SCRN MAM PERF RSLTS DOC: HCPCS | Performed by: FAMILY MEDICINE

## 2023-03-08 PROCEDURE — 99214 OFFICE O/P EST MOD 30 MIN: CPT | Performed by: FAMILY MEDICINE

## 2023-03-08 PROCEDURE — 1090F PRES/ABSN URINE INCON ASSESS: CPT | Performed by: FAMILY MEDICINE

## 2023-03-08 PROCEDURE — G8536 NO DOC ELDER MAL SCRN: HCPCS | Performed by: FAMILY MEDICINE

## 2023-03-08 PROCEDURE — 3075F SYST BP GE 130 - 139MM HG: CPT | Performed by: FAMILY MEDICINE

## 2023-03-08 PROCEDURE — 3017F COLORECTAL CA SCREEN DOC REV: CPT | Performed by: FAMILY MEDICINE

## 2023-03-08 PROCEDURE — 1101F PT FALLS ASSESS-DOCD LE1/YR: CPT | Performed by: FAMILY MEDICINE

## 2023-03-08 PROCEDURE — 1123F ACP DISCUSS/DSCN MKR DOCD: CPT | Performed by: FAMILY MEDICINE

## 2023-03-08 PROCEDURE — 3078F DIAST BP <80 MM HG: CPT | Performed by: FAMILY MEDICINE

## 2023-03-08 PROCEDURE — 3046F HEMOGLOBIN A1C LEVEL >9.0%: CPT | Performed by: FAMILY MEDICINE

## 2023-03-08 RX ORDER — METFORMIN HYDROCHLORIDE 500 MG/1
500 TABLET, EXTENDED RELEASE ORAL
Qty: 90 TABLET | Refills: 1 | Status: SHIPPED | OUTPATIENT
Start: 2023-03-08

## 2023-03-08 RX ORDER — TRAZODONE HYDROCHLORIDE 100 MG/1
100 TABLET ORAL
Qty: 90 TABLET | Refills: 1 | Status: SHIPPED | OUTPATIENT
Start: 2023-03-08

## 2023-03-08 NOTE — PROGRESS NOTES
Chief Complaint   Patient presents with    Hypertension     Dizzy spells    Diabetes    Labs     Fasting today     1. Have you been to the ER, urgent care clinic since your last visit? Hospitalized since your last visit? No    2. Have you seen or consulted any other health care providers outside of the [address] [place] [address] since your last visit? Include any pap smears or colon screening. Yes Where: Dr Cullen Vann, Dr Jermaine Ozuna  3/8/2023  Provider:   [place]:  Diabetes Report Card   1) Have you seen the eye doctor in past year?yes    2) How would you  rate your Diabetic Diet? I try   3) How well do you take care of your feet? Self care   4) Do you keep your Primary Care Follow Up Appts? yes    5) Do you know your A1C goal?yes    6) Do you take your medications daily? yes    7) Do you check your blood sugars? yes    8) Have you gained weight?yes       9) Do you follow an exercise program?no    10) Can you do better? Try to walk      Lab Results   Component Value Date/Time    Cholesterol, total 153 11/09/2022 12:00 AM    HDL Cholesterol 52 11/09/2022 12:00 AM    LDL, calculated 79 11/09/2022 12:00 AM    LDL, calculated 93.6 02/03/2010 02:05 PM    Triglyceride 123 11/09/2022 12:00 AM    CHOL/HDL Ratio 3.2 02/03/2010 02:05 PM     Lab Results   Component Value Date/Time    Hemoglobin A1c 6.4 (H) 09/07/2022 12:00 AM    Hemoglobin A1c 6.6 (H) 05/02/2022 12:00 AM    Glucose 127 (H) 11/09/2022 12:00 AM    Microalb/Creat ratio (ug/mg creat.) 4 05/02/2022 12:00 AM    LDL, calculated 79 11/09/2022 12:00 AM    LDL, calculated 93.6 02/03/2010 02:05 PM    Creatinine 0.77 11/09/2022 12:00 AM      Lab Results   Component Value Date/Time    Microalb/Creat ratio (ug/mg creat.) 4 05/02/2022 12:00 AM      Chief Complaint   Patient presents with    Hypertension     Dizzy spells    Diabetes    Labs     Fasting today     she is a 74y.o. year old female who presents for evaluation.     See Diabetic Report Card listed above. Patient Active Problem List    Diagnosis    Tracheal stenosis    History of prolactinoma    High cholesterol    Type 2 diabetes mellitus without complication, without long-term current use of insulin ([facility])    Presbycusis of both ears    Allergic rhinitis due to animal hair and dander    Osteoarthritis of multiple joints     Total L knee replacement 5/1/2019      Other insomnia    Vitamin D deficiency    Anxiety     Managed by Psych Dr. Efren Moctezuma      Asthma    Gastroesophageal reflux    Herpes simplex type 1 antibody positive    Hypertensive disorder    Menopause    Vaginal ulcer    Obstructive sleep apnea syndrome       Reviewed PmHx, RxHx, FmHx, SocHx, AllgHx--dated and updated in the chart. Review of Systems - negative except as listed above in the HPI    Objective:     Vitals:    03/08/23 1112   BP: 131/79   Pulse: 74   Resp: 14   Temp: 98 °F (36.7 °C)   TempSrc: Oral   SpO2: 93%   Weight: 223 lb (101.2 kg)   Height: 5' (1.524 m)         Assessment/ Plan:   Diagnoses and all orders for this visit:    1. Type 2 diabetes mellitus without complication, without long-term current use of insulin ([facility])  -     HEMOGLOBIN A1C WITH EAG; Future  -     LIPID PANEL; Future  -     METABOLIC PANEL, COMPREHENSIVE; Future  -     CBC WITH AUTOMATED DIFF; Future  -     TSH 3RD GENERATION; Future  At goal  2. Vertigo  Occasional dizzy spells secondary to eustachian tube dysfunction. 3. Obesity, Class III, BMI 40-49.9 (morbid obesity) ([facility])  DW diet  4. Other insomnia  -     traZODone (DESYREL) 100 mg tablet; Take 1 Tablet by mouth nightly. Restart  5. High cholesterol  See labs  Other orders  -     metFORMIN ER (GLUCOPHAGE XR) 500 mg tablet; Take 1 Tablet by mouth daily (with dinner). Follow-up and Dispositions    Return in about 6 months (around 9/8/2023).        Lab Results   Component Value Date/Time    Cholesterol, total 153 11/09/2022 12:00 AM    HDL Cholesterol 52 11/09/2022 12:00 AM    LDL, calculated 79 11/09/2022 12:00 AM    LDL, calculated 93.6 02/03/2010 02:05 PM    Triglyceride 123 11/09/2022 12:00 AM    CHOL/HDL Ratio 3.2 02/03/2010 02:05 PM     Lab Results   Component Value Date/Time    Hemoglobin A1c 6.4 (H) 09/07/2022 12:00 AM    Hemoglobin A1c 6.6 (H) 05/02/2022 12:00 AM    Microalb/Creat ratio (ug/mg creat.) 4 05/02/2022 12:00 AM    LDL, calculated 79 11/09/2022 12:00 AM    LDL, calculated 93.6 02/03/2010 02:05 PM    Creatinine 0.77 11/09/2022 12:00 AM          Discussed with patient goal of Diabetes to include:  HgA1C <7, LDL cholesterol <100, Blood pressure <140/80. Discussed with patient diet and weight management and to get regular exercise. Recommend yearly eye exams and daily foot care. The patient understands and agrees with the plan. I have discussed the diagnosis with the patient and the intended plan as seen in the above orders. The patient has received an after-visit summary and questions were answered concerning future plans. Medication Side Effects and Warnings were discussed with patient  Patient Labs were reviewed and or requested  Patient Past Records were reviewed and or requested    Mal Skinner M.D. [id number] [facility]    There are no Patient Instructions on file for this visit.

## 2023-03-08 NOTE — PROGRESS NOTES
Chief Complaint   Patient presents with    Hypertension     Dizzy spells    Diabetes    Labs     Fasting today     1. Have you been to the ER, urgent care clinic since your last visit? Hospitalized since your last visit? No    2. Have you seen or consulted any other health care providers outside of the [address] [place] [address] since your last visit? Include any pap smears or colon screening. Yes Where: Dr Mony Milton, Dr Deepthi Skelton  3/8/2023  Provider:   [place]:  Diabetes Report Card   1) Have you seen the eye doctor in past year?yes    2) How would you  rate your Diabetic Diet? I try   3) How well do you take care of your feet? Self care   4) Do you keep your Primary Care Follow Up Appts? yes    5) Do you know your A1C goal?yes    6) Do you take your medications daily? yes    7) Do you check your blood sugars? yes    8) Have you gained weight?yes       9) Do you follow an exercise program?no    10) Can you do better? Try to walk      Lab Results   Component Value Date/Time    Cholesterol, total 153 11/09/2022 12:00 AM    HDL Cholesterol 52 11/09/2022 12:00 AM    LDL, calculated 79 11/09/2022 12:00 AM    LDL, calculated 93.6 02/03/2010 02:05 PM    Triglyceride 123 11/09/2022 12:00 AM    CHOL/HDL Ratio 3.2 02/03/2010 02:05 PM     Lab Results   Component Value Date/Time    Hemoglobin A1c 6.4 (H) 09/07/2022 12:00 AM    Hemoglobin A1c 6.6 (H) 05/02/2022 12:00 AM    Glucose 127 (H) 11/09/2022 12:00 AM    Microalb/Creat ratio (ug/mg creat.) 4 05/02/2022 12:00 AM    LDL, calculated 79 11/09/2022 12:00 AM    LDL, calculated 93.6 02/03/2010 02:05 PM    Creatinine 0.77 11/09/2022 12:00 AM

## 2023-03-10 LAB
ALBUMIN SERPL-MCNC: 4.4 G/DL (ref 3.8–4.8)
ALBUMIN/GLOB SERPL: 1.5 {RATIO} (ref 1.2–2.2)
ALP SERPL-CCNC: 114 IU/L (ref 44–121)
ALT SERPL-CCNC: 22 IU/L (ref 0–32)
AST SERPL-CCNC: 23 IU/L (ref 0–40)
BASOPHILS # BLD AUTO: 0 X10E3/UL (ref 0–0.2)
BASOPHILS NFR BLD AUTO: 1 %
BILIRUB SERPL-MCNC: 0.3 MG/DL (ref 0–1.2)
BUN SERPL-MCNC: 10 MG/DL (ref 8–27)
BUN/CREAT SERPL: 12 (ref 12–28)
CALCIUM SERPL-MCNC: 9.2 MG/DL (ref 8.7–10.3)
CHLORIDE SERPL-SCNC: 99 MMOL/L (ref 96–106)
CHOLEST SERPL-MCNC: 147 MG/DL (ref 100–199)
CO2 SERPL-SCNC: 24 MMOL/L (ref 20–29)
CREAT SERPL-MCNC: 0.86 MG/DL (ref 0.57–1)
EGFRCR SERPLBLD CKD-EPI 2021: 75 ML/MIN/1.73
EOSINOPHIL # BLD AUTO: 0.3 X10E3/UL (ref 0–0.4)
EOSINOPHIL NFR BLD AUTO: 4 %
ERYTHROCYTE [DISTWIDTH] IN BLOOD BY AUTOMATED COUNT: 12.4 % (ref 11.7–15.4)
EST. AVERAGE GLUCOSE BLD GHB EST-MCNC: 146 MG/DL
GLOBULIN SER CALC-MCNC: 2.9 G/DL (ref 1.5–4.5)
GLUCOSE SERPL-MCNC: 108 MG/DL (ref 70–99)
HBA1C MFR BLD: 6.7 % (ref 4.8–5.6)
HCT VFR BLD AUTO: 39.8 % (ref 34–46.6)
HDLC SERPL-MCNC: 58 MG/DL
HGB BLD-MCNC: 13.1 G/DL (ref 11.1–15.9)
IMM GRANULOCYTES # BLD AUTO: 0 X10E3/UL (ref 0–0.1)
IMM GRANULOCYTES NFR BLD AUTO: 0 %
IMP & REVIEW OF LAB RESULTS: NORMAL
LDLC SERPL CALC-MCNC: 67 MG/DL (ref 0–99)
LYMPHOCYTES # BLD AUTO: 1.9 X10E3/UL (ref 0.7–3.1)
LYMPHOCYTES NFR BLD AUTO: 25 %
MCH RBC QN AUTO: 31 PG (ref 26.6–33)
MCHC RBC AUTO-ENTMCNC: 32.9 G/DL (ref 31.5–35.7)
MCV RBC AUTO: 94 FL (ref 79–97)
MONOCYTES # BLD AUTO: 0.6 X10E3/UL (ref 0.1–0.9)
MONOCYTES NFR BLD AUTO: 7 %
NEUTROPHILS # BLD AUTO: 5 X10E3/UL (ref 1.4–7)
NEUTROPHILS NFR BLD AUTO: 63 %
PLATELET # BLD AUTO: 377 X10E3/UL (ref 150–450)
POTASSIUM SERPL-SCNC: 4.6 MMOL/L (ref 3.5–5.2)
PROT SERPL-MCNC: 7.3 G/DL (ref 6–8.5)
RBC # BLD AUTO: 4.22 X10E6/UL (ref 3.77–5.28)
SODIUM SERPL-SCNC: 142 MMOL/L (ref 134–144)
TRIGL SERPL-MCNC: 123 MG/DL (ref 0–149)
TSH SERPL DL<=0.005 MIU/L-ACNC: 2.11 UIU/ML (ref 0.45–4.5)
VLDLC SERPL CALC-MCNC: 22 MG/DL (ref 5–40)
WBC # BLD AUTO: 7.8 X10E3/UL (ref 3.4–10.8)

## 2023-03-10 NOTE — PROGRESS NOTES
Patient's labs are good and stable. No changes to medical regimen. Please encourage patient (if appropriate) to sign up for Mychart and text them the link if needed.     Dr. Bush Numbers

## 2023-04-20 RX ORDER — ESTRADIOL 1 MG/1
1 TABLET ORAL DAILY
Qty: 90 TABLET | Refills: 1 | Status: SHIPPED | OUTPATIENT
Start: 2023-04-20

## 2023-08-03 ENCOUNTER — TELEPHONE (OUTPATIENT)
Age: 68
End: 2023-08-03

## 2023-08-03 RX ORDER — HYDROCHLOROTHIAZIDE 12.5 MG/1
12.5 CAPSULE, GELATIN COATED ORAL DAILY
Qty: 90 CAPSULE | Refills: 3 | Status: SHIPPED | OUTPATIENT
Start: 2023-08-03

## 2023-08-03 NOTE — TELEPHONE ENCOUNTER
[facility] needs a refill of HCTZ. They have 1 pills/supply left and are requesting a 90 day supply with refills. Pharmacy has been updated in the chart. Patient was advised or scheduled an appointment for the future and to request refills thru the [employer] [facility] or by requesting a refill from their pharmacy in the future. Patient was also advised to check with their pharmacy for status of when refills are available.

## 2023-08-18 ENCOUNTER — TELEPHONE (OUTPATIENT)
Age: 68
End: 2023-08-18

## 2023-08-18 RX ORDER — ATORVASTATIN CALCIUM 20 MG/1
20 TABLET, FILM COATED ORAL DAILY
Qty: 90 TABLET | Refills: 1 | Status: SHIPPED | OUTPATIENT
Start: 2023-08-18

## 2023-08-18 NOTE — TELEPHONE ENCOUNTER
We received a fax refill request for [facility]. Please escribe Atorvastatin to their pharmacy. The pharmacy is correct in the chart and they are requesting a 90 day supply.   Refills: 1

## 2023-09-12 ENCOUNTER — OFFICE VISIT (OUTPATIENT)
Age: 68
End: 2023-09-12
Payer: MEDICAID

## 2023-09-12 VITALS
SYSTOLIC BLOOD PRESSURE: 102 MMHG | RESPIRATION RATE: 17 BRPM | BODY MASS INDEX: 43.59 KG/M2 | WEIGHT: 222 LBS | HEART RATE: 79 BPM | DIASTOLIC BLOOD PRESSURE: 71 MMHG | HEIGHT: 60 IN | OXYGEN SATURATION: 96 %

## 2023-09-12 DIAGNOSIS — R53.83 OTHER FATIGUE: ICD-10-CM

## 2023-09-12 DIAGNOSIS — E78.00 HIGH CHOLESTEROL: ICD-10-CM

## 2023-09-12 DIAGNOSIS — I10 PRIMARY HYPERTENSION: ICD-10-CM

## 2023-09-12 DIAGNOSIS — G47.33 OBSTRUCTIVE SLEEP APNEA SYNDROME: ICD-10-CM

## 2023-09-12 DIAGNOSIS — E11.9 TYPE 2 DIABETES MELLITUS WITHOUT COMPLICATION, WITHOUT LONG-TERM CURRENT USE OF INSULIN (HCC): Primary | ICD-10-CM

## 2023-09-12 DIAGNOSIS — F51.01 PRIMARY INSOMNIA: ICD-10-CM

## 2023-09-12 PROCEDURE — 3074F SYST BP LT 130 MM HG: CPT | Performed by: FAMILY MEDICINE

## 2023-09-12 PROCEDURE — 99214 OFFICE O/P EST MOD 30 MIN: CPT | Performed by: FAMILY MEDICINE

## 2023-09-12 PROCEDURE — 3078F DIAST BP <80 MM HG: CPT | Performed by: FAMILY MEDICINE

## 2023-09-12 PROCEDURE — 1123F ACP DISCUSS/DSCN MKR DOCD: CPT | Performed by: FAMILY MEDICINE

## 2023-09-12 PROCEDURE — 3044F HG A1C LEVEL LT 7.0%: CPT | Performed by: FAMILY MEDICINE

## 2023-09-12 RX ORDER — FLUOXETINE HYDROCHLORIDE 40 MG/1
40 CAPSULE ORAL DAILY
Qty: 30 CAPSULE | Refills: 3 | Status: SHIPPED | OUTPATIENT
Start: 2023-09-12

## 2023-09-12 SDOH — ECONOMIC STABILITY: HOUSING INSECURITY
IN THE LAST 12 MONTHS, WAS THERE A TIME WHEN YOU DID NOT HAVE A STEADY PLACE TO SLEEP OR SLEPT IN A SHELTER (INCLUDING NOW)?: NO

## 2023-09-12 SDOH — ECONOMIC STABILITY: FOOD INSECURITY: WITHIN THE PAST 12 MONTHS, THE FOOD YOU BOUGHT JUST DIDN'T LAST AND YOU DIDN'T HAVE MONEY TO GET MORE.: NEVER TRUE

## 2023-09-12 SDOH — ECONOMIC STABILITY: INCOME INSECURITY: HOW HARD IS IT FOR YOU TO PAY FOR THE VERY BASICS LIKE FOOD, HOUSING, MEDICAL CARE, AND HEATING?: NOT HARD AT ALL

## 2023-09-12 SDOH — ECONOMIC STABILITY: FOOD INSECURITY: WITHIN THE PAST 12 MONTHS, YOU WORRIED THAT YOUR FOOD WOULD RUN OUT BEFORE YOU GOT MONEY TO BUY MORE.: NEVER TRUE

## 2023-09-12 ASSESSMENT — PATIENT HEALTH QUESTIONNAIRE - PHQ9
SUM OF ALL RESPONSES TO PHQ QUESTIONS 1-9: 0
SUM OF ALL RESPONSES TO PHQ9 QUESTIONS 1 & 2: 0
SUM OF ALL RESPONSES TO PHQ QUESTIONS 1-9: 0
2. FEELING DOWN, DEPRESSED OR HOPELESS: 0
1. LITTLE INTEREST OR PLEASURE IN DOING THINGS: 0
SUM OF ALL RESPONSES TO PHQ QUESTIONS 1-9: 0
SUM OF ALL RESPONSES TO PHQ QUESTIONS 1-9: 0

## 2023-09-12 NOTE — PROGRESS NOTES
Chief Complaint   Patient presents with    Diabetes     Vitals:    09/12/23 0953   BP: 102/71   Site: Right Upper Arm   Position: Sitting   Cuff Size: Large Adult   Pulse: 79   Resp: 17   SpO2: 96%   Weight: 222 lb (100.7 kg)   Height: 5' (1.524 m)     1. Have you been to the ER, urgent care clinic since your last visit? Hospitalized since your last visit? No    2. Have you seen or consulted any other health care providers outside of the [address] [place] [address] since your last visit? Include any pap smears or colon screening.  No

## 2023-09-12 NOTE — PROGRESS NOTES
Chief Complaint   Patient presents with    Diabetes     Vitals:    09/12/23 0953   BP: 102/71   Site: Right Upper Arm   Position: Sitting   Cuff Size: Large Adult   Pulse: 79   Resp: 17   SpO2: 96%   Weight: 222 lb (100.7 kg)   Height: 5' (1.524 m)     1. Have you been to the ER, urgent care clinic since your last visit? Hospitalized since your last visit? No    2. Have you seen or consulted any other health care providers outside of the [address] [place] [address] Avenue since your last visit? Include any pap smears or colon screening. No  Subjective:     Traci Thurston is a 79 y.o. female seen for follow up of diabetes. She also has hypertension and hyperlipidemia. See Diabetic Report Card Above. Complete PmHX, Sochx, RxHx, Labs all reviewed in the chart. Past Medical History:   Diagnosis Date    Anxiety 2/11/2021    Arthritis     osteo    Asthma     Diabetes ([address])     Borderline diabetes    Gastroesophageal reflux 2/11/2021    GERD (gastroesophageal reflux disease)     Herpes simplex type 1 antibody positive 2/11/2021    Hypertensive disorder 2/11/2021    Ill-defined condition     insomnia    Ill-defined condition     Left ventricular heart hypertension without heart failure    Ill-defined condition     spondylosis of lumbar    Ill-defined condition     vitamin D deficiency    Ill-defined condition     hearing loss wears, hearing aids bilat    Ill-defined condition     abnormality of gait, uses walking cane    Menopause 2/11/2021    Psychiatric disorder     anxiety, depression    Sleep apnea     Uses C-Pap machine at night    Vaginal ulcer 2/11/2021       Review of Systems  A comprehensive review of systems was negative except for that written in the HPI. Objective:   Exam:    Vitals:    09/12/23 0953   BP: 102/71   Site: Right Upper Arm   Position: Sitting   Cuff Size: Large Adult   Pulse: 79   Resp: 17   SpO2: 96%   Weight: 222 lb (100.7 kg)   Height: 5' (1.524 m)      Assessment/Plan:     1.  Diabetes

## 2023-10-26 RX ORDER — ESTRADIOL 1 MG/1
1 TABLET ORAL DAILY
Qty: 90 TABLET | Refills: 1 | Status: SHIPPED | OUTPATIENT
Start: 2023-10-26

## 2024-02-06 LAB
ESTIMATED AVERAGE GLUCOSE: NORMAL
HBA1C MFR BLD: 7.1 %

## 2024-02-09 ENCOUNTER — OFFICE VISIT (OUTPATIENT)
Facility: CLINIC | Age: 69
End: 2024-02-09
Payer: MEDICARE

## 2024-02-09 VITALS
OXYGEN SATURATION: 96 % | BODY MASS INDEX: 45.75 KG/M2 | SYSTOLIC BLOOD PRESSURE: 127 MMHG | HEIGHT: 60 IN | RESPIRATION RATE: 18 BRPM | TEMPERATURE: 97.1 F | WEIGHT: 233 LBS | DIASTOLIC BLOOD PRESSURE: 73 MMHG | HEART RATE: 89 BPM

## 2024-02-09 DIAGNOSIS — I10 PRIMARY HYPERTENSION: Primary | ICD-10-CM

## 2024-02-09 DIAGNOSIS — E55.9 VITAMIN D DEFICIENCY: ICD-10-CM

## 2024-02-09 DIAGNOSIS — E78.00 HIGH CHOLESTEROL: ICD-10-CM

## 2024-02-09 DIAGNOSIS — E11.65 TYPE 2 DIABETES MELLITUS WITH HYPERGLYCEMIA, WITHOUT LONG-TERM CURRENT USE OF INSULIN (HCC): ICD-10-CM

## 2024-02-09 PROBLEM — E11.9 TYPE 2 DIABETES MELLITUS WITHOUT COMPLICATION, WITHOUT LONG-TERM CURRENT USE OF INSULIN (HCC): Status: RESOLVED | Noted: 2022-01-03 | Resolved: 2024-02-09

## 2024-02-09 LAB
HBA1C MFR BLD HPLC: 7.1 %
MICROALBUMIN/CREATININE RATIO, EXTERNAL: <4

## 2024-02-09 PROCEDURE — 3074F SYST BP LT 130 MM HG: CPT | Performed by: NURSE PRACTITIONER

## 2024-02-09 PROCEDURE — 1123F ACP DISCUSS/DSCN MKR DOCD: CPT | Performed by: NURSE PRACTITIONER

## 2024-02-09 PROCEDURE — 99204 OFFICE O/P NEW MOD 45 MIN: CPT | Performed by: NURSE PRACTITIONER

## 2024-02-09 PROCEDURE — 3078F DIAST BP <80 MM HG: CPT | Performed by: NURSE PRACTITIONER

## 2024-02-09 RX ORDER — CITALOPRAM 40 MG/1
40 TABLET ORAL DAILY
COMMUNITY

## 2024-02-09 RX ORDER — ONDANSETRON 4 MG/1
4 TABLET, ORALLY DISINTEGRATING ORAL 3 TIMES DAILY PRN
Qty: 21 TABLET | Refills: 0 | Status: SHIPPED | OUTPATIENT
Start: 2024-02-09

## 2024-02-09 RX ORDER — SEMAGLUTIDE 1.34 MG/ML
1 INJECTION, SOLUTION SUBCUTANEOUS WEEKLY
Qty: 9 ML | Refills: 1 | Status: SHIPPED | OUTPATIENT
Start: 2024-02-09

## 2024-02-09 RX ORDER — SEMAGLUTIDE 0.68 MG/ML
INJECTION, SOLUTION SUBCUTANEOUS
Qty: 3 ML | Refills: 0 | Status: SHIPPED | OUTPATIENT
Start: 2024-02-09

## 2024-02-09 RX ORDER — LORATADINE 10 MG/1
10 TABLET ORAL 2 TIMES DAILY PRN
COMMUNITY

## 2024-02-09 ASSESSMENT — PATIENT HEALTH QUESTIONNAIRE - PHQ9
SUM OF ALL RESPONSES TO PHQ QUESTIONS 1-9: 1
2. FEELING DOWN, DEPRESSED OR HOPELESS: 1
SUM OF ALL RESPONSES TO PHQ QUESTIONS 1-9: 1
1. LITTLE INTEREST OR PLEASURE IN DOING THINGS: 0
SUM OF ALL RESPONSES TO PHQ9 QUESTIONS 1 & 2: 1

## 2024-02-09 NOTE — PROGRESS NOTES
[facility]  [address] [place] [address] [address]   [place] [address]  (P) [phone]   (F) [phone]    Subjective:   Abi Lake is a 68 y.o. female  new here with complaints of New Patient and foot issue    Patient presenting for hypertension, hyperlipidemia, and diabetes followup. Reports started on metformin and was having issues with nausea, vomiting, abdominal pain. Uses steroid neb due to issues with breathing.  With more swelling and varicose of the bilateral feet x months. Patient reports blood pressures at home most frequently not checked . Patient reports no new symptoms Patient denies chest pain, shortness of breath, and weakness. Patient reports  good compliance with medications. Current treatments include diet modification, weight loss.     Patient Active Problem List   Diagnosis    Hypertensive disorder    Anxiety    Obstructive sleep apnea syndrome    Other insomnia    Vitamin D deficiency    Presbycusis of both ears    Menopause    Herpes simplex type 1 antibody positive    Allergic rhinitis due to animal hair and dander    Asthma    Osteoarthritis of multiple joints    Gastroesophageal reflux    Vaginal ulcer    History of prolactinoma    Tracheal stenosis    High cholesterol      Current Outpatient Medications   Medication Sig Dispense Refill    citalopram (CELEXA) 40 MG tablet Take 1 tablet by mouth daily      loratadine (CLARITIN) 10 MG tablet Take 1 tablet by mouth 2 times daily as needed      Semaglutide,0.25 or 0.5MG/DOS, (OZEMPIC, 0.25 OR 0.5 MG/DOSE,) 2 MG/3ML SOPN 0.25mg once weekly for 4 wks then increase to 0.5mg once weekly for at least 4 wks.  If still not to goal, can increase to 1mg weekly after that. 3 mL 0    Semaglutide, 1 MG/DOSE, (OZEMPIC, 1 MG/DOSE,) 4 MG/3ML SOPN Inject 1 mg into the skin once a week 9 mL 1    ondansetron (ZOFRAN-ODT) 4 MG disintegrating tablet Take 1 tablet by mouth 3 times daily as needed for Nausea or Vomiting 21 tablet

## 2024-02-09 NOTE — PROGRESS NOTES
\"Have you been to the ER, urgent care clinic since your last visit?  Hospitalized since your last visit?\"    NO    “Have you seen or consulted any other health care providers outside of [facility] since your last visit?”    NO    Chief Complaint   Patient presents with    New Patient    foot issue        /75 (Site: Right Upper Arm, Position: Sitting, Cuff Size: Large Adult)   Pulse 89   Temp 97.1 °F (36.2 °C) (Temporal)   Resp 18   Ht 1.524 m (5')   Wt 105.7 kg (233 lb)   LMP  (LMP Unknown)   SpO2 96%   BMI 45.50 kg/m²

## 2024-03-15 DIAGNOSIS — E11.65 TYPE 2 DIABETES MELLITUS WITH HYPERGLYCEMIA, WITHOUT LONG-TERM CURRENT USE OF INSULIN (HCC): ICD-10-CM

## 2024-03-18 RX ORDER — SEMAGLUTIDE 0.68 MG/ML
INJECTION, SOLUTION SUBCUTANEOUS
Qty: 4 ML | Refills: 0 | Status: SHIPPED | OUTPATIENT
Start: 2024-03-18

## 2024-03-18 NOTE — TELEPHONE ENCOUNTER
Requested Prescriptions     Pending Prescriptions Disp Refills    OZEMPIC, 0.25 OR 0.5 MG/DOSE, 2 MG/3ML SOPN [Pharmacy Med Name: OZEMPIC 0.25-0.5 MG/DOSE PEN]       Si.25MG ONCE WEEKLY FOR 4 WKS THEN INCREASE TO 0.5MG ONCE WEEKLY FOR AT LEAST 4 WKS. IF STILL NOT TO GOAL, CAN INCREASE TO 1MG WEEKLY AFTER THAT.

## 2024-04-08 ENCOUNTER — TELEPHONE (OUTPATIENT)
Facility: CLINIC | Age: 69
End: 2024-04-08

## 2024-04-08 NOTE — TELEPHONE ENCOUNTER
Mimi junior [facility] asked what does of Ozempic pt is on at this time.  Mimi can be reached  [phone].  Thank you

## 2024-04-09 ENCOUNTER — TELEPHONE (OUTPATIENT)
Facility: CLINIC | Age: 69
End: 2024-04-09

## 2024-04-09 ENCOUNTER — APPOINTMENT (OUTPATIENT)
Facility: HOSPITAL | Age: 69
End: 2024-04-09
Payer: MEDICARE

## 2024-04-09 ENCOUNTER — HOSPITAL ENCOUNTER (EMERGENCY)
Facility: HOSPITAL | Age: 69
Discharge: HOME | End: 2024-04-09
Payer: MEDICARE

## 2024-04-09 ENCOUNTER — OFFICE VISIT (OUTPATIENT)
Facility: CLINIC | Age: 69
End: 2024-04-09
Payer: MEDICARE

## 2024-04-09 VITALS
HEART RATE: 89 BPM | OXYGEN SATURATION: 95 % | HEIGHT: 60 IN | BODY MASS INDEX: 40.84 KG/M2 | SYSTOLIC BLOOD PRESSURE: 121 MMHG | TEMPERATURE: 98 F | RESPIRATION RATE: 18 BRPM | WEIGHT: 208 LBS | DIASTOLIC BLOOD PRESSURE: 99 MMHG

## 2024-04-09 VITALS
BODY MASS INDEX: 40.85 KG/M2 | RESPIRATION RATE: 18 BRPM | OXYGEN SATURATION: 98 % | DIASTOLIC BLOOD PRESSURE: 74 MMHG | HEART RATE: 107 BPM | TEMPERATURE: 97.7 F | SYSTOLIC BLOOD PRESSURE: 97 MMHG | WEIGHT: 208.1 LBS | HEIGHT: 60 IN

## 2024-04-09 DIAGNOSIS — R11.2 NAUSEA VOMITING AND DIARRHEA: Primary | ICD-10-CM

## 2024-04-09 DIAGNOSIS — I10 PRIMARY HYPERTENSION: ICD-10-CM

## 2024-04-09 DIAGNOSIS — N17.9 AKI (ACUTE KIDNEY INJURY) (HCC): ICD-10-CM

## 2024-04-09 DIAGNOSIS — E11.65 TYPE 2 DIABETES MELLITUS WITH HYPERGLYCEMIA, WITHOUT LONG-TERM CURRENT USE OF INSULIN (HCC): ICD-10-CM

## 2024-04-09 DIAGNOSIS — R19.7 NAUSEA VOMITING AND DIARRHEA: Primary | ICD-10-CM

## 2024-04-09 DIAGNOSIS — R10.13 EPIGASTRIC ABDOMINAL PAIN: ICD-10-CM

## 2024-04-09 PROBLEM — E11.9 TYPE 2 DIABETES MELLITUS (HCC): Status: ACTIVE | Noted: 2024-04-09

## 2024-04-09 LAB
ALBUMIN SERPL-MCNC: 4.2 G/DL (ref 3.5–5)
ALBUMIN/GLOB SERPL: 1.1 (ref 1.1–2.2)
ALP SERPL-CCNC: 112 U/L (ref 45–117)
ALT SERPL-CCNC: 35 U/L (ref 12–78)
ANION GAP SERPL CALC-SCNC: 6 MMOL/L (ref 5–15)
AST SERPL W P-5'-P-CCNC: 26 U/L (ref 15–37)
BASOPHILS # BLD: 0 K/UL (ref 0–0.1)
BASOPHILS NFR BLD: 0 % (ref 0–1)
BILIRUB SERPL-MCNC: 0.7 MG/DL (ref 0.2–1)
BUN SERPL-MCNC: 11 MG/DL (ref 6–20)
BUN/CREAT SERPL: 9 (ref 12–20)
CA-I BLD-MCNC: 9.6 MG/DL (ref 8.5–10.1)
CHLORIDE SERPL-SCNC: 105 MMOL/L (ref 97–108)
CO2 SERPL-SCNC: 28 MMOL/L (ref 21–32)
CREAT SERPL-MCNC: 1.22 MG/DL (ref 0.55–1.02)
DIFFERENTIAL METHOD BLD: ABNORMAL
EKG ATRIAL RATE: 82 BPM
EKG DIAGNOSIS: NORMAL
EKG P AXIS: 61 DEGREES
EKG P-R INTERVAL: 148 MS
EKG Q-T INTERVAL: 394 MS
EKG QRS DURATION: 72 MS
EKG QTC CALCULATION (BAZETT): 460 MS
EKG R AXIS: 24 DEGREES
EKG T AXIS: 42 DEGREES
EKG VENTRICULAR RATE: 82 BPM
EOSINOPHIL # BLD: 0.5 K/UL (ref 0–0.4)
EOSINOPHIL NFR BLD: 4 % (ref 0–7)
ERYTHROCYTE [DISTWIDTH] IN BLOOD BY AUTOMATED COUNT: 12.7 % (ref 11.5–14.5)
GLOBULIN SER CALC-MCNC: 3.8 G/DL (ref 2–4)
GLUCOSE SERPL-MCNC: 96 MG/DL (ref 65–100)
HCT VFR BLD AUTO: 43.6 % (ref 35–47)
HGB BLD-MCNC: 14.8 G/DL (ref 11.5–16)
IMM GRANULOCYTES # BLD AUTO: 0.1 K/UL (ref 0–0.04)
IMM GRANULOCYTES NFR BLD AUTO: 1 % (ref 0–0.5)
LIPASE SERPL-CCNC: 29 U/L (ref 13–75)
LYMPHOCYTES # BLD: 2.5 K/UL (ref 0.8–3.5)
LYMPHOCYTES NFR BLD: 21 % (ref 12–49)
MCH RBC QN AUTO: 31.4 PG (ref 26–34)
MCHC RBC AUTO-ENTMCNC: 33.9 G/DL (ref 30–36.5)
MCV RBC AUTO: 92.4 FL (ref 80–99)
MONOCYTES # BLD: 1 K/UL (ref 0–1)
MONOCYTES NFR BLD: 8 % (ref 5–13)
NEUTS SEG # BLD: 8.1 K/UL (ref 1.8–8)
NEUTS SEG NFR BLD: 66 % (ref 32–75)
NRBC # BLD: 0 K/UL (ref 0–0.01)
NRBC BLD-RTO: 0 PER 100 WBC
PLATELET # BLD AUTO: 395 K/UL (ref 150–400)
PMV BLD AUTO: 11.3 FL (ref 8.9–12.9)
POTASSIUM SERPL-SCNC: 3.7 MMOL/L (ref 3.5–5.1)
PROT SERPL-MCNC: 8 G/DL (ref 6.4–8.2)
RBC # BLD AUTO: 4.72 M/UL (ref 3.8–5.2)
SODIUM SERPL-SCNC: 139 MMOL/L (ref 136–145)
TROPONIN I SERPL HS-MCNC: 5 NG/L (ref 0–51)
WBC # BLD AUTO: 12.2 K/UL (ref 3.6–11)

## 2024-04-09 PROCEDURE — 3074F SYST BP LT 130 MM HG: CPT | Performed by: STUDENT IN AN ORGANIZED HEALTH CARE EDUCATION/TRAINING PROGRAM

## 2024-04-09 PROCEDURE — 3051F HG A1C>EQUAL 7.0%<8.0%: CPT | Performed by: STUDENT IN AN ORGANIZED HEALTH CARE EDUCATION/TRAINING PROGRAM

## 2024-04-09 PROCEDURE — 83690 ASSAY OF LIPASE: CPT

## 2024-04-09 PROCEDURE — 1123F ACP DISCUSS/DSCN MKR DOCD: CPT | Performed by: STUDENT IN AN ORGANIZED HEALTH CARE EDUCATION/TRAINING PROGRAM

## 2024-04-09 PROCEDURE — 36415 COLL VENOUS BLD VENIPUNCTURE: CPT

## 2024-04-09 PROCEDURE — 2580000003 HC RX 258

## 2024-04-09 PROCEDURE — 96374 THER/PROPH/DIAG INJ IV PUSH: CPT

## 2024-04-09 PROCEDURE — 74176 CT ABD & PELVIS W/O CONTRAST: CPT

## 2024-04-09 PROCEDURE — 85025 COMPLETE CBC W/AUTO DIFF WBC: CPT

## 2024-04-09 PROCEDURE — 3078F DIAST BP <80 MM HG: CPT | Performed by: STUDENT IN AN ORGANIZED HEALTH CARE EDUCATION/TRAINING PROGRAM

## 2024-04-09 PROCEDURE — 84484 ASSAY OF TROPONIN QUANT: CPT

## 2024-04-09 PROCEDURE — 96375 TX/PRO/DX INJ NEW DRUG ADDON: CPT

## 2024-04-09 PROCEDURE — 96361 HYDRATE IV INFUSION ADD-ON: CPT

## 2024-04-09 PROCEDURE — 80053 COMPREHEN METABOLIC PANEL: CPT

## 2024-04-09 PROCEDURE — 93005 ELECTROCARDIOGRAM TRACING: CPT

## 2024-04-09 PROCEDURE — 6360000002 HC RX W HCPCS

## 2024-04-09 PROCEDURE — 99215 OFFICE O/P EST HI 40 MIN: CPT | Performed by: STUDENT IN AN ORGANIZED HEALTH CARE EDUCATION/TRAINING PROGRAM

## 2024-04-09 PROCEDURE — 99284 EMERGENCY DEPT VISIT MOD MDM: CPT

## 2024-04-09 RX ORDER — LOPERAMIDE HYDROCHLORIDE 2 MG/1
2 TABLET ORAL 4 TIMES DAILY PRN
Qty: 15 TABLET | Refills: 0 | Status: SHIPPED | OUTPATIENT
Start: 2024-04-09 | End: 2024-04-19

## 2024-04-09 RX ORDER — LORAZEPAM 2 MG/ML
0.5 INJECTION INTRAMUSCULAR ONCE
Status: COMPLETED | OUTPATIENT
Start: 2024-04-09 | End: 2024-04-09

## 2024-04-09 RX ORDER — SEMAGLUTIDE 1.34 MG/ML
1 INJECTION, SOLUTION SUBCUTANEOUS WEEKLY
Qty: 9 ML | Refills: 1 | Status: SHIPPED | OUTPATIENT
Start: 2024-04-09 | End: 2024-04-09 | Stop reason: ALTCHOICE

## 2024-04-09 RX ORDER — 0.9 % SODIUM CHLORIDE 0.9 %
1000 INTRAVENOUS SOLUTION INTRAVENOUS ONCE
Status: COMPLETED | OUTPATIENT
Start: 2024-04-09 | End: 2024-04-09

## 2024-04-09 RX ORDER — ONDANSETRON 2 MG/ML
4 INJECTION INTRAMUSCULAR; INTRAVENOUS ONCE
Status: COMPLETED | OUTPATIENT
Start: 2024-04-09 | End: 2024-04-09

## 2024-04-09 RX ORDER — SEMAGLUTIDE 1.34 MG/ML
1 INJECTION, SOLUTION SUBCUTANEOUS WEEKLY
Qty: 9 ML | Refills: 1 | Status: CANCELLED | OUTPATIENT
Start: 2024-04-09

## 2024-04-09 RX ADMIN — ONDANSETRON 4 MG: 2 INJECTION INTRAMUSCULAR; INTRAVENOUS at 13:58

## 2024-04-09 RX ADMIN — SODIUM CHLORIDE 1000 ML: 9 INJECTION, SOLUTION INTRAVENOUS at 13:59

## 2024-04-09 RX ADMIN — LORAZEPAM 0.5 MG: 2 INJECTION INTRAMUSCULAR; INTRAVENOUS at 15:09

## 2024-04-09 ASSESSMENT — PAIN - FUNCTIONAL ASSESSMENT: PAIN_FUNCTIONAL_ASSESSMENT: NONE - DENIES PAIN

## 2024-04-09 NOTE — PROGRESS NOTES
[facility]  Subjective       Chief Complaint   Patient presents with    Nausea & Vomiting    Diarrhea    Headache    Anxiety    Medication Reaction     HPI:  Abi Lake is a 69 y.o. female.    Here for acute visit   New to provider  Saw Krysten Edwards one time 2/09/2024.    Gives herself ozempic on Fridays, says she has had symptoms all a long with taking ozempic but not this bad  Went up to next highest dose this last Friday.    Saws throwing up since Saturday, several times a day  Says she has had diarrhea as well several times a day  Not having pain today but has had epigastric/and RLQ pain/periumbilical    Feeling very nervous and jittery   Says this has messed with her depression  Says she is having \"hot and cold flashes\"  Has not checked with thermometer    Says she is heaving today and \"nothing is coming up\"  Says she has been wearing \"diapers\" because of stool incontinence     More than 7 episodes of diarrhea every day since Saturday  Says she has not had an appetite   Ate some saltine crackers yesterday  Says she had small bowl of macaroni and cheese Threw this up within an hour  HAS NOT DRANK ANY LIQUIDS today  Says it immediately comes back up    Hasn't had diarrhea or vomiting today but has not had anything by mouth    Tried to drink water yesterday but didn't even get a bottle in without throwing up    Didn't tolerate metformin in the past    Reports her blood pressures are normally systolic 90s and 100s    Hemoglobin A1C   Date Value Ref Range Status   02/06/2024 7.1 % Final       Past Medical History:   Diagnosis Date    Anxiety 2/11/2021    Arthritis     osteo    Asthma     Diabetes ([facility])     Borderline diabetes    Gastroesophageal reflux 2/11/2021    GERD (gastroesophageal reflux disease)     Herpes simplex type 1 antibody positive 2/11/2021    Hypertensive disorder 2/11/2021    Ill-defined condition     insomnia    Ill-defined condition     Left ventricular heart

## 2024-04-09 NOTE — PROGRESS NOTES
I have reviewed all needed documentation in preparation for visit. Verified patient by name and date of birth  Chief Complaint   Patient presents with    Nausea & Vomiting    Diarrhea    Headache    Anxiety    Medication Reaction     Patient started higher 1mg dose of Ozempic. Patient states that on Saturday around 1400 she started feeling nauseous. She states that around 1700 she vomited and had violent diarrhea. She states that she is not able to eat anything as she has no appetite. She states that nothing tastes right. Patient is complaining of extreme fatigue. She states that the inside of her body \"is all over.\" Patient tried to eat mac and cheese last night and she vomited it up. Patient states that she is dry heaving at times as well. She did not go to sleep until about 430am this morning.     Vitals:    04/09/24 1116   BP: 97/74   Pulse: (!) 107   Resp: 18   Temp: 97.7 °F (36.5 °C)   SpO2: 98%       Health Maintenance Due   Topic Date Due    COVID-19 Vaccine (1) Never done    Diabetic retinal exam  Never done    Respiratory Syncytial Virus (RSV) Pregnant or age 60 yrs+ (1 - 1-dose 60+ series) Never done    Shingles vaccine (2 of 3) 05/25/2022    Diabetic foot exam  05/02/2023    GFR test (Diabetes, CKD 3-4, OR last GFR 15-59)  03/08/2024    Annual Wellness Visit (Medicare)  Never done       1. \"Have you been to the ER, urgent care clinic since your last visit?  Hospitalized since your last visit?\" No    2. \"Have you seen or consulted any other health care providers outside of the [facility] since your last visit?\" No     3. For patients aged 45-75: Has the patient had a colonoscopy / FIT/ Cologuard? Yes - no Care Gap present      If the patient is female:    4. For patients aged 40-74: Has the patient had a mammogram within the past 2 years? Yes - no Care Gap present      5. For patients aged 21-65: Has the patient had a pap smear? Yes - no Care Gap present    Waleska Aguilar LPN

## 2024-04-09 NOTE — DISCHARGE INSTRUCTIONS
Thank you!  Thank you for allowing me to care for you in the emergency department. It is my goal to provide you with excellent care. If you have not received excellent quality care, please ask to speak to the nurse manager. Please fill out the survey that will come to you by mail or email since we listen to your feedback!     Below you will find a list of your tests from today's visit.  Should you have any questions, please do not hesitate to call the emergency department.    Labs  Recent Results (from the past 12 hour(s))   CBC with Auto Differential    Collection Time: 04/09/24  1:19 PM   Result Value Ref Range    WBC 12.2 (H) 3.6 - 11.0 K/uL    RBC 4.72 3.80 - 5.20 M/uL    Hemoglobin 14.8 11.5 - 16.0 g/dL    Hematocrit 43.6 35.0 - 47.0 %    MCV 92.4 80.0 - 99.0 FL    MCH 31.4 26.0 - 34.0 PG    MCHC 33.9 30.0 - 36.5 g/dL    RDW 12.7 11.5 - 14.5 %    Platelets 395 150 - 400 K/uL    MPV 11.3 8.9 - 12.9 FL    Nucleated RBCs 0.0 0.0  WBC    nRBC 0.00 0.00 - 0.01 K/uL    Neutrophils % 66 32 - 75 %    Lymphocytes % 21 12 - 49 %    Monocytes % 8 5 - 13 %    Eosinophils % 4 0 - 7 %    Basophils % 0 0 - 1 %    Immature Granulocytes % 1 (H) 0 - 0.5 %    Neutrophils Absolute 8.1 (H) 1.8 - 8.0 K/UL    Lymphocytes Absolute 2.5 0.8 - 3.5 K/UL    Monocytes Absolute 1.0 0.0 - 1.0 K/UL    Eosinophils Absolute 0.5 (H) 0.0 - 0.4 K/UL    Basophils Absolute 0.0 0.0 - 0.1 K/UL    Immature Granulocytes Absolute 0.1 (H) 0.00 - 0.04 K/UL    Differential Type AUTOMATED     CMP    Collection Time: 04/09/24  1:19 PM   Result Value Ref Range    Sodium 139 136 - 145 mmol/L    Potassium 3.7 3.5 - 5.1 mmol/L    Chloride 105 97 - 108 mmol/L    CO2 28 21 - 32 mmol/L    Anion Gap 6 5 - 15 mmol/L    Glucose 96 65 - 100 mg/dL    BUN 11 6 - 20 mg/dL    Creatinine 1.22 (H) 0.55 - 1.02 mg/dL    Bun/Cre Ratio 9 (L) 12 - 20      Est, Glom Filt Rate 49 (L) >60 ml/min/1.73m2    Calcium 9.6 8.5 - 10.1 mg/dL    Total Bilirubin 0.7 0.2 - 1.0 mg/dL

## 2024-04-09 NOTE — TELEPHONE ENCOUNTER
2/9/24 was 1mg ozimpic and the one sent on 3/18/24 is the .25-.50 is that the correct one meant to be sent please advise message below.

## 2024-04-09 NOTE — ED TRIAGE NOTES
Patient here with c/o vomiting and diarrhea x 3 days. Also chills, abd pain. Believes its from Ozempic.

## 2024-04-09 NOTE — ED PROVIDER NOTES
(electronically signed)    (Please note that parts of this dictation were completed with voice recognition software. Quite often unanticipated grammatical, syntax, homophones, and other interpretive errors are inadvertently transcribed by the computer software. Please disregards these errors. Please excuse any errors that have escaped final proofreading.)       Grace Kline PA-C  04/09/24 2984

## 2024-04-12 ENCOUNTER — OFFICE VISIT (OUTPATIENT)
Facility: CLINIC | Age: 69
End: 2024-04-12
Payer: MEDICARE

## 2024-04-12 VITALS
DIASTOLIC BLOOD PRESSURE: 70 MMHG | HEART RATE: 100 BPM | HEIGHT: 60 IN | RESPIRATION RATE: 18 BRPM | TEMPERATURE: 95.3 F | SYSTOLIC BLOOD PRESSURE: 119 MMHG | OXYGEN SATURATION: 97 % | BODY MASS INDEX: 41.54 KG/M2 | WEIGHT: 211.6 LBS

## 2024-04-12 DIAGNOSIS — R11.2 NAUSEA AND VOMITING, UNSPECIFIED VOMITING TYPE: Primary | ICD-10-CM

## 2024-04-12 DIAGNOSIS — F41.9 ANXIETY: ICD-10-CM

## 2024-04-12 PROCEDURE — 1123F ACP DISCUSS/DSCN MKR DOCD: CPT | Performed by: NURSE PRACTITIONER

## 2024-04-12 PROCEDURE — 3074F SYST BP LT 130 MM HG: CPT | Performed by: NURSE PRACTITIONER

## 2024-04-12 PROCEDURE — 3078F DIAST BP <80 MM HG: CPT | Performed by: NURSE PRACTITIONER

## 2024-04-12 PROCEDURE — 99214 OFFICE O/P EST MOD 30 MIN: CPT | Performed by: NURSE PRACTITIONER

## 2024-04-12 RX ORDER — PROMETHAZINE HYDROCHLORIDE 12.5 MG/1
12.5 TABLET ORAL 4 TIMES DAILY PRN
Qty: 20 TABLET | Refills: 3 | Status: SHIPPED | OUTPATIENT
Start: 2024-04-12 | End: 2024-04-19

## 2024-04-12 RX ORDER — PROPRANOLOL HYDROCHLORIDE 10 MG/1
10 TABLET ORAL DAILY PRN
Qty: 90 TABLET | Refills: 3 | Status: SHIPPED | OUTPATIENT
Start: 2024-04-12

## 2024-04-12 NOTE — PROGRESS NOTES
\"Have you been to the ER, urgent care clinic since your last visit?  Hospitalized since your last visit?\"    YES - When: approximately 1 days ago.  [facility]    “Have you seen or consulted any other health care providers outside of [facility] since your last visit?”    NO    Chief Complaint   Patient presents with    Follow-Up from Hospital     /70 (Site: Left Upper Arm, Position: Sitting, Cuff Size: Large Adult)   Pulse 100   Temp (!) 95.3 °F (35.2 °C) (Temporal)   Resp 18   Ht 1.524 m (5')   Wt 96 kg (211 lb 9.6 oz)   LMP  (LMP Unknown)   SpO2 97%   BMI 41.33 kg/m²               Click Here for Release of Records Request

## 2024-04-12 NOTE — PROGRESS NOTES
[facility]  [address] [place] [address] [address]   [place] [address]  (P) [phone]   (F) [phone]    Subjective:   Abi Lake is a 69 y.o. female  established here with complaints of Follow-Up from Hospital   Reports since starting Ozempic 1mg now with nausea, vomiting, diarrhea. Last dose 1 week ago. Can't tolerate potatoes, bread. Improved slightly with nausea medication but persistent. In the past phenergan works better to mange such symptoms. No blood in emesis nor blood in stool.     Patient Active Problem List   Diagnosis    Hypertensive disorder    Anxiety    Obstructive sleep apnea syndrome    Other insomnia    Vitamin D deficiency    Presbycusis of both ears    Menopause    Herpes simplex type 1 antibody positive    Allergic rhinitis due to animal hair and dander    Asthma    Osteoarthritis of multiple joints    Gastroesophageal reflux    Vaginal ulcer    History of prolactinoma    Tracheal stenosis    High cholesterol    Type 2 diabetes mellitus      Current Outpatient Medications   Medication Sig Dispense Refill    promethazine (PHENERGAN) 12.5 MG tablet Take 1 tablet by mouth 4 times daily as needed for Nausea 20 tablet 3    propranolol (INDERAL) 10 MG tablet Take 1 tablet by mouth daily as needed (anxiety) 90 tablet 3    loperamide (IMODIUM A-D) 2 MG tablet Take 1 tablet by mouth 4 times daily as needed for Diarrhea 15 tablet 0    citalopram (CELEXA) 40 MG tablet Take 1 tablet by mouth daily      loratadine (CLARITIN) 10 MG tablet Take 1 tablet by mouth 2 times daily as needed      estradiol (ESTRACE) 1 MG tablet TAKE 1 TABLET BY MOUTH EVERY DAY 90 tablet 1    hydroCHLOROthiazide (MICROZIDE) 12.5 MG capsule Take 1 capsule by mouth daily 90 capsule 3    Lancets MISC Use to monitor fasting glucose twice per day. E11.9      budesonide (PULMICORT) 0.5 MG/2ML nebulizer suspension INHALE 1 VIAL BY NEBULIZER TWICE A DAY      busPIRone (BUSPAR) 10 MG tablet Take 1

## 2024-04-18 RX ORDER — ATORVASTATIN CALCIUM 20 MG/1
20 TABLET, FILM COATED ORAL DAILY
Qty: 90 TABLET | Refills: 1 | Status: SHIPPED | OUTPATIENT
Start: 2024-04-18

## 2024-04-18 NOTE — TELEPHONE ENCOUNTER
----- Message from Betty Rose sent at 4/18/2024 12:24 PM EDT -----  Subject: Refill Request    QUESTIONS  Name of Medication? atorvastatin (LIPITOR) 20 MG tablet  Patient-reported dosage and instructions? 20MG 1 DAILY  How many days do you have left? 0  Preferred Pharmacy? [id number]  Pharmacy phone number (if available)? [phone]  ---------------------------------------------------------------------------  --------------  CALL BACK INFO  What is the best way for the office to contact you? OK to leave message on   voicemail  Preferred Call Back Phone Number? [phone]  ---------------------------------------------------------------------------  --------------  SCRIPT ANSWERS  Relationship to Patient? Self

## 2024-04-25 ENCOUNTER — TELEPHONE (OUTPATIENT)
Facility: CLINIC | Age: 69
End: 2024-04-25

## 2024-04-25 NOTE — TELEPHONE ENCOUNTER
----- Message from Laurel Han sent at 4/25/2024  2:16 PM EDT -----  Subject: Message to Provider    QUESTIONS  Information for Provider? Mimi from [facility] needs to speak to this   patients nurse or Doctor. We tried to get thru multiple times with no   success. Please call Mimi [phone] she needs help with a dosage for this   pt on Ozempic Mimi shows that the doctors office sent in for a refill on   4/9/2024. Please as soon as possible  ---------------------------------------------------------------------------  --------------  CALL BACK INFO  [phone]; OK to leave message on voicemail  ---------------------------------------------------------------------------  --------------  SCRIPT ANSWERS  Relationship to Patient? Covered Entity  Covered Entity Type? Pharmacy?  Representative Name? Mimi

## 2024-04-25 NOTE — TELEPHONE ENCOUNTER
Spoke with ecc about a pharmacy call  trying to clarify dosage for patient this patient was taken off of the ozimpic due to being sent to the hospital patient stated she wasn't going to take it anymore either dr. Love discontinued the medication 4/9/2024

## 2024-04-25 NOTE — TELEPHONE ENCOUNTER
Called to speak with lindy bynum with veronica from the call center explained to her she took a note that the patient is no longer on the ozimpic due to being sent to the hospital for the side affects and dr. Love discontinued it and at her appointment with danna rodriguez she didn't want to restart the medication. Veronica wrote up the note and sent it to the pharmacy tech to let them know stop autofill on the ozimpic.

## 2024-05-30 RX ORDER — BLOOD SUGAR DIAGNOSTIC
1 STRIP MISCELLANEOUS DAILY
COMMUNITY
End: 2024-05-30 | Stop reason: SDUPTHER

## 2024-05-30 RX ORDER — BLOOD SUGAR DIAGNOSTIC
1 STRIP MISCELLANEOUS DAILY
Qty: 100 EACH | Refills: 1 | Status: SHIPPED | OUTPATIENT
Start: 2024-05-30

## 2024-05-30 NOTE — TELEPHONE ENCOUNTER
Requested Prescriptions     Pending Prescriptions Disp Refills    blood glucose test strips (EXACTECH TEST) strip 100 each      Si each by In Vitro route daily As needed.

## 2024-05-30 NOTE — TELEPHONE ENCOUNTER
Patient called and needs test strips for here accucheck machine.      Pharmacy [facility]

## 2024-06-11 ENCOUNTER — OFFICE VISIT (OUTPATIENT)
Facility: CLINIC | Age: 69
End: 2024-06-11
Payer: MEDICARE

## 2024-06-11 VITALS
BODY MASS INDEX: 42.21 KG/M2 | SYSTOLIC BLOOD PRESSURE: 131 MMHG | RESPIRATION RATE: 16 BRPM | TEMPERATURE: 97.7 F | OXYGEN SATURATION: 98 % | HEART RATE: 76 BPM | DIASTOLIC BLOOD PRESSURE: 77 MMHG | WEIGHT: 215 LBS | HEIGHT: 60 IN

## 2024-06-11 DIAGNOSIS — K21.9 GASTROESOPHAGEAL REFLUX DISEASE WITHOUT ESOPHAGITIS: ICD-10-CM

## 2024-06-11 DIAGNOSIS — E78.00 HIGH CHOLESTEROL: ICD-10-CM

## 2024-06-11 DIAGNOSIS — Z78.0 MENOPAUSE: ICD-10-CM

## 2024-06-11 DIAGNOSIS — I10 PRIMARY HYPERTENSION: ICD-10-CM

## 2024-06-11 DIAGNOSIS — Z00.00 MEDICARE ANNUAL WELLNESS VISIT, SUBSEQUENT: Primary | ICD-10-CM

## 2024-06-11 DIAGNOSIS — E55.9 VITAMIN D DEFICIENCY: ICD-10-CM

## 2024-06-11 DIAGNOSIS — E11.65 TYPE 2 DIABETES MELLITUS WITH HYPERGLYCEMIA, WITHOUT LONG-TERM CURRENT USE OF INSULIN (HCC): ICD-10-CM

## 2024-06-11 PROCEDURE — 3051F HG A1C>EQUAL 7.0%<8.0%: CPT | Performed by: NURSE PRACTITIONER

## 2024-06-11 PROCEDURE — 3078F DIAST BP <80 MM HG: CPT | Performed by: NURSE PRACTITIONER

## 2024-06-11 PROCEDURE — 3075F SYST BP GE 130 - 139MM HG: CPT | Performed by: NURSE PRACTITIONER

## 2024-06-11 PROCEDURE — G0439 PPPS, SUBSEQ VISIT: HCPCS | Performed by: NURSE PRACTITIONER

## 2024-06-11 PROCEDURE — 99213 OFFICE O/P EST LOW 20 MIN: CPT | Performed by: NURSE PRACTITIONER

## 2024-06-11 PROCEDURE — 1123F ACP DISCUSS/DSCN MKR DOCD: CPT | Performed by: NURSE PRACTITIONER

## 2024-06-11 RX ORDER — ESTRADIOL 1 MG/1
1 TABLET ORAL DAILY
Qty: 90 TABLET | Refills: 1 | Status: SHIPPED | OUTPATIENT
Start: 2024-06-11

## 2024-06-11 ASSESSMENT — PATIENT HEALTH QUESTIONNAIRE - PHQ9
SUM OF ALL RESPONSES TO PHQ QUESTIONS 1-9: 0
2. FEELING DOWN, DEPRESSED OR HOPELESS: NOT AT ALL
1. LITTLE INTEREST OR PLEASURE IN DOING THINGS: NOT AT ALL
SUM OF ALL RESPONSES TO PHQ QUESTIONS 1-9: 0
SUM OF ALL RESPONSES TO PHQ9 QUESTIONS 1 & 2: 0

## 2024-06-11 ASSESSMENT — LIFESTYLE VARIABLES
HOW MANY STANDARD DRINKS CONTAINING ALCOHOL DO YOU HAVE ON A TYPICAL DAY: PATIENT DOES NOT DRINK
HOW OFTEN DO YOU HAVE A DRINK CONTAINING ALCOHOL: NEVER

## 2024-06-11 NOTE — PROGRESS NOTES
Medicare Annual Wellness Visit    [place] is here for Medicare AWV    Assessment & Plan   Medicare annual wellness visit, subsequent  Primary hypertension  Gastroesophageal reflux disease without esophagitis  Type 2 diabetes mellitus with hyperglycemia, without long-term current use of insulin (HCC)  -     AMB POC HEMOGLOBIN A1C  Vitamin D deficiency  High cholesterol  Menopause  -     estradiol (ESTRACE) 1 MG tablet; Take 1 tablet by mouth daily, Disp-90 tablet, R-1Normal  A1c today 6.6 we will not make any changes to current regimen continue with diet and exercise to continue to manage diabetes.  Hypertension, GERD, hyperlipidemia, vitamin D deficiency is stable we will not make any changes to current regimen, continue with currently prescribed medication as directed.  Recommendations for Preventive Services Due: see orders and patient instructions/AVS.  Recommended screening schedule for the next 5-10 years is provided to the patient in written form: see Patient Instructions/AVS.     Return in about 6 months (around 12/11/2024) for DM, HLD, HTN.     Subjective   The following acute and/or chronic problems were also addressed today:  Patient presenting for hypertension, hyperlipidemia, and diabetes followup. Did not tolerate ozempic, doing well now. Current no sodas, no white potato, no bread, only once a week pasta, changes diet, have been able to keep off the weight down from 236 lbs.  Patient reports blood pressures at home most frequently not checked . Patient reports weight loss Patient denies chest pain, shortness of breath, weakness, and orthostatic hypotension. Patient reports  good compliance with medications, no side effects from medications noted, and good compliance with diabetic diet. Current treatments include diet modification, weight loss.   Patient's complete Health Risk Assessment and screening values have been reviewed and are found in Flowsheets. The following problems were reviewed

## 2024-06-11 NOTE — PROGRESS NOTES
Chief Complaint   Patient presents with    Medicare AWV     /77   Pulse 76   Temp 97.7 °F (36.5 °C)   Resp 16   Ht 1.524 m (5')   Wt 97.5 kg (215 lb)   LMP  (LMP Unknown)   SpO2 98%   BMI 41.99 kg/m²   \"Have you been to the ER, urgent care clinic since your last visit?  Hospitalized since your last visit?\"    NO    “Have you seen or consulted any other health care providers outside of [facility] since your last visit?”    NO            Click Here for Release of Records Request

## 2024-07-29 NOTE — TELEPHONE ENCOUNTER
Patient is requesting medications sent to [facility]    Citalopram 40 mg  1 tab daily    Omeprazole 40 mg  1 cap daily    Trazodone 100 mg  1 tab nightly    #90 for all.      Patient states her insurance will end at the end of the month and is requesting these refills be sent quickly

## 2024-07-30 RX ORDER — OMEPRAZOLE 40 MG/1
40 CAPSULE, DELAYED RELEASE ORAL EVERY MORNING
Qty: 90 CAPSULE | Refills: 1 | Status: SHIPPED | OUTPATIENT
Start: 2024-07-30

## 2024-07-30 RX ORDER — TRAZODONE HYDROCHLORIDE 100 MG/1
100 TABLET ORAL NIGHTLY
Qty: 90 TABLET | Refills: 1 | Status: SHIPPED | OUTPATIENT
Start: 2024-07-30

## 2024-07-30 RX ORDER — CITALOPRAM 40 MG/1
40 TABLET ORAL DAILY
Qty: 90 TABLET | Refills: 1 | Status: SHIPPED | OUTPATIENT
Start: 2024-07-30

## 2024-09-03 RX ORDER — HYDROCHLOROTHIAZIDE 12.5 MG/1
12.5 CAPSULE ORAL DAILY
Qty: 90 CAPSULE | Refills: 3 | Status: SHIPPED | OUTPATIENT
Start: 2024-09-03

## 2024-09-03 NOTE — TELEPHONE ENCOUNTER
Method of communication:  []Fax []Phone call []In person   []Other:    Who is making request:  What medication/s (include strength and dosing):     Hydrochlorothiazide 12.5mg capsule(Prior doctor Phong Crump started pt on this medication)    This is for a:   []Refill    [x]New medication request  []Follow up on prior request    Pharmacy: [facility]  Best contact for patient: [phone]    Additional notes: 90 day supply

## 2024-09-03 NOTE — TELEPHONE ENCOUNTER
Requested Prescriptions     Pending Prescriptions Disp Refills    hydroCHLOROthiazide 12.5 MG capsule 90 capsule 3     Sig: Take 1 capsule by mouth daily

## 2024-10-09 NOTE — TELEPHONE ENCOUNTER
Requested Prescriptions     Pending Prescriptions Disp Refills    atorvastatin (LIPITOR) 20 MG tablet [Pharmacy Med Name: ATORVASTATIN 20 MG TABLET] 90 tablet 1     Sig: TAKE 1 TABLET BY MOUTH EVERY DAY          Date of last OV:6/11/24  Future OV visit scheduled:  [x] Yes -> Date: 12/13/24  [] No    Last Refill: [] N/A  Date:4/18/24  Qty:90  # of refills:1    Med pending for provider review:  [x] Yes  [] No (provide reason why):     Requested Pharmacy updated in ENC:  [x] Yes    Applicable labs (provide date of completion):  [] Diabetic med- A1c:  [x] Cholesterol med- Lipids:2/9/24  [] BP med- CMP or BMP:   [] Thyroid med- TSH:  [] Gout med- Uric acid:  [] Prostate med- PSA:  [] Other (provide what type of med and lab):    Additional notes:

## 2024-10-11 RX ORDER — ATORVASTATIN CALCIUM 20 MG/1
20 TABLET, FILM COATED ORAL DAILY
Qty: 90 TABLET | Refills: 1 | Status: SHIPPED | OUTPATIENT
Start: 2024-10-11

## 2024-12-04 DIAGNOSIS — Z78.0 MENOPAUSE: ICD-10-CM

## 2024-12-04 NOTE — TELEPHONE ENCOUNTER
Requested Prescriptions     Pending Prescriptions Disp Refills    estradiol (ESTRACE) 1 MG tablet [Pharmacy Med Name: ESTRADIOL 1 MG TABLET] 90 tablet 1     Sig: TAKE 1 TABLET BY MOUTH EVERY DAY

## 2024-12-06 RX ORDER — ESTRADIOL 1 MG/1
1 TABLET ORAL DAILY
Qty: 90 TABLET | Refills: 1 | Status: SHIPPED | OUTPATIENT
Start: 2024-12-06

## 2024-12-13 ENCOUNTER — OFFICE VISIT (OUTPATIENT)
Facility: CLINIC | Age: 69
End: 2024-12-13

## 2024-12-13 VITALS
HEIGHT: 60 IN | WEIGHT: 221 LBS | BODY MASS INDEX: 43.39 KG/M2 | SYSTOLIC BLOOD PRESSURE: 125 MMHG | DIASTOLIC BLOOD PRESSURE: 75 MMHG | HEART RATE: 83 BPM | TEMPERATURE: 98.2 F | OXYGEN SATURATION: 96 % | RESPIRATION RATE: 16 BRPM

## 2024-12-13 DIAGNOSIS — E55.9 VITAMIN D DEFICIENCY: ICD-10-CM

## 2024-12-13 DIAGNOSIS — E11.65 TYPE 2 DIABETES MELLITUS WITH HYPERGLYCEMIA, WITHOUT LONG-TERM CURRENT USE OF INSULIN (HCC): Primary | ICD-10-CM

## 2024-12-13 DIAGNOSIS — E78.00 HIGH CHOLESTEROL: ICD-10-CM

## 2024-12-13 DIAGNOSIS — I10 PRIMARY HYPERTENSION: ICD-10-CM

## 2024-12-13 DIAGNOSIS — N63.42 SUBAREOLAR MASS OF LEFT BREAST: ICD-10-CM

## 2024-12-13 LAB — HBA1C MFR BLD: 7.3 %

## 2024-12-13 SDOH — ECONOMIC STABILITY: FOOD INSECURITY: WITHIN THE PAST 12 MONTHS, YOU WORRIED THAT YOUR FOOD WOULD RUN OUT BEFORE YOU GOT MONEY TO BUY MORE.: PATIENT DECLINED

## 2024-12-13 SDOH — ECONOMIC STABILITY: INCOME INSECURITY: HOW HARD IS IT FOR YOU TO PAY FOR THE VERY BASICS LIKE FOOD, HOUSING, MEDICAL CARE, AND HEATING?: PATIENT DECLINED

## 2024-12-13 SDOH — ECONOMIC STABILITY: FOOD INSECURITY: WITHIN THE PAST 12 MONTHS, THE FOOD YOU BOUGHT JUST DIDN'T LAST AND YOU DIDN'T HAVE MONEY TO GET MORE.: PATIENT DECLINED

## 2024-12-13 NOTE — PROGRESS NOTES
\"Have you been to the ER, urgent care clinic since your last visit?  Hospitalized since your last visit?\"    NO    “Have you seen or consulted any other health care providers outside our system since your last visit?”    NO    Have you had a mammogram?”   NO    Date of last Mammogram: 8/30/2023       “Have you had a diabetic eye exam?”    NO     No diabetic eye exam on file   Chief Complaint   Patient presents with    Follow-up       /75 (Site: Right Upper Arm, Position: Sitting, Cuff Size: Large Adult)   Pulse 83   Temp 98.2 °F (36.8 °C) (Temporal)   Resp 16   Ht 1.524 m (5')   Wt 100.2 kg (221 lb)   LMP  (LMP Unknown)   SpO2 96%   BMI 43.16 kg/m²          
Orders   1. Type 2 diabetes mellitus with hyperglycemia, without long-term current use of insulin (HCC)  AMB POC HEMOGLOBIN A1C    Lipid Panel    Comprehensive Metabolic Panel    CBC with Auto Differential      2. Subareolar mass of left breast  [facility] DIGITAL DIAGNOSTIC W OR WO CAD LEFT    US BREAST COMPLETE LEFT      3. High cholesterol  Lipid Panel    Comprehensive Metabolic Panel    CBC with Auto Differential      4. Primary hypertension  Lipid Panel    Comprehensive Metabolic Panel    CBC with Auto Differential      5. Vitamin D deficiency           last a1c 7.3, if higher in April 2025 may needs meds then.  HTN stable no changes today  HLD status unknown, will do labs today  Will get diag mammo and US for eval  11/22/24 3 mm oval mass in the left breast central to the nipple anterior depth.  Educated on medication, uses, side effects, as well as signs and symptoms that may merit immediate medical attention. Patient is not to share medication and use only as indicated.   Patient verbalized understanding and agreement.       Return in about 4 months (around 4/13/2025) for DM last a1c 7.3, if higher may needs meds now..     On this date 12/13/2024 I have spent 34 minutes reviewing previous notes, test results and face to face with the patient discussing the diagnosis and importance of compliance with the treatment plan as well as documenting on the day of the visit.  Aspects of this note may have been generated using voice recognition software. Despite editing, there may be some syntax errors.    Krysten Edwards, IAN - CNP

## 2024-12-14 LAB
ALBUMIN SERPL-MCNC: 4.1 G/DL (ref 3.9–4.9)
ALP SERPL-CCNC: 125 IU/L (ref 44–121)
ALT SERPL-CCNC: 13 IU/L (ref 0–32)
AST SERPL-CCNC: 15 IU/L (ref 0–40)
BASOPHILS # BLD AUTO: 0.1 X10E3/UL (ref 0–0.2)
BASOPHILS NFR BLD AUTO: 1 %
BILIRUB SERPL-MCNC: 0.4 MG/DL (ref 0–1.2)
BUN SERPL-MCNC: 12 MG/DL (ref 8–27)
BUN/CREAT SERPL: 15 (ref 12–28)
CALCIUM SERPL-MCNC: 8.9 MG/DL (ref 8.7–10.3)
CHLORIDE SERPL-SCNC: 100 MMOL/L (ref 96–106)
CHOLEST SERPL-MCNC: 155 MG/DL (ref 100–199)
CO2 SERPL-SCNC: 26 MMOL/L (ref 20–29)
CREAT SERPL-MCNC: 0.82 MG/DL (ref 0.57–1)
EGFRCR SERPLBLD CKD-EPI 2021: 78 ML/MIN/1.73
EOSINOPHIL # BLD AUTO: 0.3 X10E3/UL (ref 0–0.4)
EOSINOPHIL NFR BLD AUTO: 3 %
ERYTHROCYTE [DISTWIDTH] IN BLOOD BY AUTOMATED COUNT: 13 % (ref 11.7–15.4)
GLOBULIN SER CALC-MCNC: 2.9 G/DL (ref 1.5–4.5)
GLUCOSE SERPL-MCNC: 141 MG/DL (ref 70–99)
HCT VFR BLD AUTO: 39.8 % (ref 34–46.6)
HDLC SERPL-MCNC: 61 MG/DL
HGB BLD-MCNC: 12.6 G/DL (ref 11.1–15.9)
IMM GRANULOCYTES # BLD AUTO: 0.1 X10E3/UL (ref 0–0.1)
IMM GRANULOCYTES NFR BLD AUTO: 1 %
LDLC SERPL CALC-MCNC: 71 MG/DL (ref 0–99)
LYMPHOCYTES # BLD AUTO: 1.6 X10E3/UL (ref 0.7–3.1)
LYMPHOCYTES NFR BLD AUTO: 20 %
MCH RBC QN AUTO: 29.4 PG (ref 26.6–33)
MCHC RBC AUTO-ENTMCNC: 31.7 G/DL (ref 31.5–35.7)
MCV RBC AUTO: 93 FL (ref 79–97)
MONOCYTES # BLD AUTO: 0.5 X10E3/UL (ref 0.1–0.9)
MONOCYTES NFR BLD AUTO: 6 %
NEUTROPHILS # BLD AUTO: 5.7 X10E3/UL (ref 1.4–7)
NEUTROPHILS NFR BLD AUTO: 69 %
PLATELET # BLD AUTO: 415 X10E3/UL (ref 150–450)
POTASSIUM SERPL-SCNC: 4.2 MMOL/L (ref 3.5–5.2)
PROT SERPL-MCNC: 7 G/DL (ref 6–8.5)
RBC # BLD AUTO: 4.28 X10E6/UL (ref 3.77–5.28)
SODIUM SERPL-SCNC: 142 MMOL/L (ref 134–144)
TRIGL SERPL-MCNC: 133 MG/DL (ref 0–149)
VLDLC SERPL CALC-MCNC: 23 MG/DL (ref 5–40)
WBC # BLD AUTO: 8.3 X10E3/UL (ref 3.4–10.8)

## 2024-12-19 ENCOUNTER — TELEPHONE (OUTPATIENT)
Facility: CLINIC | Age: 69
End: 2024-12-19

## 2024-12-19 NOTE — TELEPHONE ENCOUNTER
Method of communication:  []Fax [x]Phone call []In person   []Other:    Who is making request:Patient  What medication/s (include strength and dosing):    Citalopram 40 mg tablet    This is for a:   [x]Refill    []New medication request  []Follow up on prior request    Pharmacy:[facility] [place]  Best contact for patient:[phone]    Additional notes:

## 2025-01-21 NOTE — TELEPHONE ENCOUNTER
Requested Prescriptions     Pending Prescriptions Disp Refills    traZODone (DESYREL) 100 MG tablet [Pharmacy Med Name: TRAZODONE 100 MG TABLET] 90 tablet 1     Sig: TAKE 1 TABLET BY MOUTH EVERY DAY AT NIGHT    omeprazole (PRILOSEC) 40 MG delayed release capsule [Pharmacy Med Name: OMEPRAZOLE DR 40 MG CAPSULE] 90 capsule 1     Sig: TAKE 1 CAPSULE BY MOUTH EVERY DAY IN THE MORNING

## 2025-01-28 NOTE — TELEPHONE ENCOUNTER
Requested Prescriptions     Pending Prescriptions Disp Refills    traZODone (DESYREL) 100 MG tablet [Pharmacy Med Name: TRAZODONE 100 MG TABLET] 90 tablet 1     Sig: TAKE 1 TABLET BY MOUTH EVERY DAY AT NIGHT    omeprazole (PRILOSEC) 40 MG delayed release capsule [Pharmacy Med Name: OMEPRAZOLE DR 40 MG CAPSULE] 90 capsule 1     Sig: TAKE 1 CAPSULE BY MOUTH EVERY DAY IN THE MORNING            Date of last OV:12/13/24    Karly  Future OV visit scheduled:  [x] Yes -> Date: 4/15/25    Karly  [] No          Trazodone  Last Refill: [] N/A  Date:7/30/24   Karly  Qty:90  # of refills:1      Omperazole  7/30/24   Karly  Qty 90  Refills 1    Med pending for provider review:  [x] Yes  [] No (provide reason why):     Requested Pharmacy updated in ENC:  [x] Yes    Applicable labs (provide date of completion):  [] Diabetic med- A1c:  [] Cholesterol med- Lipids:  [] BP med- CMP or BMP:   [] Thyroid med- TSH:  [] Gout med- Uric acid:  [] Prostate med- PSA:  [] Other (provide what type of med and lab):    Additional notes:

## 2025-01-29 RX ORDER — OMEPRAZOLE 40 MG/1
40 CAPSULE, DELAYED RELEASE ORAL EVERY MORNING
Qty: 90 CAPSULE | Refills: 1 | Status: SHIPPED | OUTPATIENT
Start: 2025-01-29

## 2025-01-29 RX ORDER — TRAZODONE HYDROCHLORIDE 100 MG/1
100 TABLET ORAL NIGHTLY
Qty: 90 TABLET | Refills: 1 | Status: SHIPPED | OUTPATIENT
Start: 2025-01-29

## 2025-02-28 DIAGNOSIS — F41.9 ANXIETY: ICD-10-CM

## 2025-02-28 RX ORDER — ATORVASTATIN CALCIUM 20 MG/1
20 TABLET, FILM COATED ORAL DAILY
Qty: 90 TABLET | Refills: 1 | Status: SHIPPED | OUTPATIENT
Start: 2025-02-28

## 2025-02-28 RX ORDER — PROPRANOLOL HYDROCHLORIDE 10 MG/1
10 TABLET ORAL DAILY PRN
Qty: 90 TABLET | Refills: 3 | Status: SHIPPED | OUTPATIENT
Start: 2025-02-28

## 2025-02-28 RX ORDER — CITALOPRAM HYDROBROMIDE 40 MG/1
40 TABLET ORAL DAILY
Qty: 90 TABLET | Refills: 1 | Status: SHIPPED | OUTPATIENT
Start: 2025-02-28

## 2025-02-28 NOTE — TELEPHONE ENCOUNTER
Requested Prescriptions     Pending Prescriptions Disp Refills    atorvastatin (LIPITOR) 20 MG tablet [Pharmacy Med Name: ATORVASTATIN 20 MG TABLET] 90 tablet 1     Sig: TAKE 1 TABLET BY MOUTH EVERY DAY

## 2025-02-28 NOTE — TELEPHONE ENCOUNTER
Requested Prescriptions     Pending Prescriptions Disp Refills    propranolol (INDERAL) 10 MG tablet [Pharmacy Med Name: PROPRANOLOL 10 MG TABLET] 90 tablet 3     Sig: TAKE 1 TABLET BY MOUTH DAILY AS NEEDED FOR ANXIETY    citalopram (CELEXA) 40 MG tablet [Pharmacy Med Name: CITALOPRAM HBR 40 MG TABLET] 90 tablet 1     Sig: TAKE 1 TABLET BY MOUTH EVERY DAY

## 2025-05-26 DIAGNOSIS — Z78.0 MENOPAUSE: ICD-10-CM

## 2025-05-27 RX ORDER — ESTRADIOL 1 MG/1
1 TABLET ORAL DAILY
Qty: 90 TABLET | Refills: 1 | Status: SHIPPED | OUTPATIENT
Start: 2025-05-27

## 2025-07-16 NOTE — TELEPHONE ENCOUNTER
Requested Prescriptions     Pending Prescriptions Disp Refills    omeprazole (PRILOSEC) 40 MG delayed release capsule 90 capsule 1     Sig: Take 1 capsule by mouth every morning

## 2025-07-16 NOTE — TELEPHONE ENCOUNTER
Method of communication:  [x]Fax []Phone call []In person   []Other:    [facility] [address] [place] [address]    What medication/s (include strength and dosing):  Omeprazole DR 40 mg capsule   Qty 90    This is for a:   [x]Refill    []New medication request  []Follow up on prior request    [facility]  Best contact for patient:[phone]    Additional notes:

## 2025-07-18 RX ORDER — OMEPRAZOLE 40 MG/1
40 CAPSULE, DELAYED RELEASE ORAL EVERY MORNING
Qty: 90 CAPSULE | Refills: 1 | Status: SHIPPED | OUTPATIENT
Start: 2025-07-18

## 2025-08-25 RX ORDER — HYDROCHLOROTHIAZIDE 12.5 MG/1
CAPSULE ORAL DAILY
Qty: 90 CAPSULE | Refills: 0 | Status: SHIPPED | OUTPATIENT
Start: 2025-08-25

## 2025-08-25 RX ORDER — CITALOPRAM HYDROBROMIDE 40 MG/1
40 TABLET ORAL DAILY
Qty: 90 TABLET | Refills: 0 | Status: SHIPPED | OUTPATIENT
Start: 2025-08-25

## 2025-08-25 RX ORDER — ATORVASTATIN CALCIUM 20 MG/1
20 TABLET, FILM COATED ORAL DAILY
Qty: 90 TABLET | Refills: 0 | Status: SHIPPED | OUTPATIENT
Start: 2025-08-25

## (undated) DEVICE — Device

## (undated) DEVICE — SOLIDIFIER MEDC 1200ML -- CONVERT TO 356117

## (undated) DEVICE — BAG BELONG PT PERS CLEAR HANDL

## (undated) DEVICE — 3M™ CUROS™ DISINFECTING CAP FOR NEEDLELESS CONNECTORS 270/CARTON 20 CARTONS/CASE CFF1-270: Brand: CUROS™

## (undated) DEVICE — SYR 3ML LL TIP 1/10ML GRAD --

## (undated) DEVICE — ADULT SPO2 SENSOR: Brand: NELLCOR

## (undated) DEVICE — 1200 GUARD II KIT W/5MM TUBE W/O VAC TUBE: Brand: GUARDIAN

## (undated) DEVICE — KIT COLON W/ 1.1OZ LUB AND 2 END

## (undated) DEVICE — CANN NASAL O2 CAPNOGRAPHY AD -- FILTERLINE

## (undated) DEVICE — SET GRAV CK VLV NEEDLESS ST 3 GANGED 4WAY STPCOCK HI FLO 10

## (undated) DEVICE — CATH IV AUTOGRD BC PNK 20GA 25 -- INSYTE

## (undated) DEVICE — KENDALL RADIOLUCENT FOAM MONITORING ELECTRODE -RECTANGULAR SHAPE: Brand: KENDALL

## (undated) DEVICE — SYR 5ML 1/5 GRAD LL NSAF LF --